# Patient Record
Sex: FEMALE | Race: WHITE | HISPANIC OR LATINO | Employment: OTHER | URBAN - METROPOLITAN AREA
[De-identification: names, ages, dates, MRNs, and addresses within clinical notes are randomized per-mention and may not be internally consistent; named-entity substitution may affect disease eponyms.]

---

## 2018-04-14 ENCOUNTER — OFFICE VISIT (OUTPATIENT)
Dept: URGENT CARE | Facility: CLINIC | Age: 48
End: 2018-04-14
Payer: MEDICAID

## 2018-04-14 VITALS
OXYGEN SATURATION: 98 % | HEART RATE: 75 BPM | TEMPERATURE: 98 F | DIASTOLIC BLOOD PRESSURE: 86 MMHG | SYSTOLIC BLOOD PRESSURE: 129 MMHG | WEIGHT: 210 LBS | BODY MASS INDEX: 38.41 KG/M2

## 2018-04-14 DIAGNOSIS — B37.31 YEAST INFECTION INVOLVING THE VAGINA AND SURROUNDING AREA: ICD-10-CM

## 2018-04-14 DIAGNOSIS — N30.00 ACUTE CYSTITIS WITHOUT HEMATURIA: ICD-10-CM

## 2018-04-14 DIAGNOSIS — R82.90 URINE ABNORMALITY: Primary | ICD-10-CM

## 2018-04-14 LAB
BILIRUB UR QL STRIP: NEGATIVE
GLUCOSE UR QL STRIP: NEGATIVE
KETONES UR QL STRIP: NEGATIVE
LEUKOCYTE ESTERASE UR QL STRIP: POSITIVE
PH, POC UA: 7 (ref 5–8)
POC BLOOD, URINE: NEGATIVE
POC NITRATES, URINE: NEGATIVE
PROT UR QL STRIP: NEGATIVE
SP GR UR STRIP: 1 (ref 1–1.03)
UROBILINOGEN UR STRIP-ACNC: NORMAL (ref 0.1–1.1)

## 2018-04-14 PROCEDURE — 81003 URINALYSIS AUTO W/O SCOPE: CPT | Mod: QW,S$GLB,, | Performed by: NURSE PRACTITIONER

## 2018-04-14 PROCEDURE — 99203 OFFICE O/P NEW LOW 30 MIN: CPT | Mod: 25,S$GLB,, | Performed by: NURSE PRACTITIONER

## 2018-04-14 RX ORDER — NITROFURANTOIN 25; 75 MG/1; MG/1
100 CAPSULE ORAL 2 TIMES DAILY
Qty: 10 CAPSULE | Refills: 0 | Status: SHIPPED | OUTPATIENT
Start: 2018-04-14 | End: 2018-04-19

## 2018-04-14 RX ORDER — FLUCONAZOLE 150 MG/1
150 TABLET ORAL DAILY
Qty: 1 TABLET | Refills: 0 | Status: SHIPPED | OUTPATIENT
Start: 2018-04-14 | End: 2018-04-15

## 2018-04-14 NOTE — PATIENT INSTRUCTIONS
Candida Vaginal Infection    You have a Candida vaginal infection. This is also known as a yeast infection. It is most often caused by a type of yeast (fungus) called Candida. Candida are normally found in the vagina. But if they increase in number, this can lead to infection and cause symptoms.  Symptoms of a yeast infection can include:  · Clumpy or thin, white discharge, which may look like cottage cheese  · Itching or burning  · Burning with urination  Certain factors can make a yeast infection more likely. These can include:  · Taking certain medicines, such as antibiotics or birth control pills  · Pregnancy  · Diabetes  · Weakened immune system  A yeast infection is most often treated with antifungal medicine. This may be given as a vaginal cream or pills you take by mouth. Treatment may last for about 1 to 7 days. Women with severe or recurrent infections may need longer courses of treatment.  Home care  · If youre prescribed medicine, be sure to use it as directed. Finish all of the medicine, even if your symptoms go away. Note: Dont try to treat yourself using over-the-counter products without talking to your provider first. He or she will let you know if this is a good option for you.  · Ask your provider what steps you can take to help reduce your risk of having a yeast infection in the future.  Follow-up care  Follow up with your healthcare provider, or as directed.  When to seek medical advice  Call your healthcare provider right away if:  · You have a fever of 100.4ºF (38ºC) or higher, or as directed by your provider.  · Your symptoms worsen, or they dont go away within a few days of starting treatment.  · You have new pain in the lower belly or pelvic region.  · You have side effects that bother you or a reaction to the cream or pills youre prescribed.  · You or any partners you have sex with have new symptoms, such as a rash, joint pain, or sores.  Date Last Reviewed: 7/30/2015  © 3598-3876 The  "[employer] [address] [place] [address]. All rights reserved. This information is not intended as a substitute for professional medical care. Always follow your healthcare professional's instructions.      Please return here or go to the Emergency Department for any concerns or worsening of condition or if this does not improve within the next 24-48 hours.  If you were prescribed antibiotics or antifungals, please take them to completion.  IPlease follow up with your primary care doctor or specialist as needed.    If you  smoke, please stop smoking.  Understanding Urinary Tract Infections (UTIs)  Most UTIs are caused by bacteria, although they may also be caused by viruses or fungi. Bacteria from the bowel are the most common source of infection. The infection may start because of any of the following:  · Sexual activity. During sex, bacteria can travel from the penis, vagina, or rectum into the urethra.   · Bacteria on the skin outside the rectum may travel into the urethra. This is more common in women since the rectum and urethra are closer to each other than in men. Wiping from front to back after using the toilet and keeping the area clean can help prevent germs from getting to the urethra.  · Blockage of urine flow through the urinary tract. If urine sits too long, germs may start to grow out of control.      Parts of the urinary tract  The infection can occur in any part of the urinary tract.  · The kidneys collect and store urine.  · The ureters carry urine from the kidneys to the bladder.  · The bladder holds urine until you are ready to let it out.  · The urethra carries urine from the bladder out of the body. It is shorter in women, so bacteria can move through it more easily. The urethra is longer in men, so a UTI is less likely to reach the bladder or kidneys in men.  Date Last Reviewed: 1/1/2017  © 8784-7778 The "[employer] [address] [place] [address].  All rights reserved. This information is not intended as a substitute for professional medical care. Always follow your healthcare professional's instructions.

## 2018-04-14 NOTE — PROGRESS NOTES
Subjective:       Patient ID: Augustina Bailey is a 47 y.o. female.    Vitals:  weight is 95.3 kg (210 lb). Her tympanic temperature is 98.4 °F (36.9 °C). Her blood pressure is 129/86 and her pulse is 75. Her oxygen saturation is 98%.     Chief Complaint: Urinary Tract Infection    Hx of recurring UTI's w yeast infection, started last pm, hx of kidney stones and recent hernia repair in 10/2017.        Urinary Tract Infection    This is a new problem. The current episode started acute onset. The problem occurs every urination. The problem has been gradually worsening. The quality of the pain is described as aching. The pain is at a severity of 0/10. The patient is experiencing no pain. There has been no fever. She is sexually active. There is a history of pyelonephritis. Associated symptoms include a discharge, flank pain, frequency, hesitancy, urgency and withholding. Pertinent negatives include no chills, hematuria, nausea or vomiting. She has tried nothing for the symptoms. The treatment provided no relief. Her past medical history is significant for hypertension, kidney stones and recurrent UTIs.     Review of Systems   Constitution: Negative for chills and fever.   Skin: Negative for itching.   Musculoskeletal: Negative for back pain.   Gastrointestinal: Negative for abdominal pain, nausea and vomiting.   Genitourinary: Positive for dysuria, flank pain, frequency, hesitancy and urgency. Negative for genital sores, hematuria, missed menses and non-menstrual bleeding.       Objective:      Physical Exam   Constitutional: She is oriented to person, place, and time. She appears well-developed and well-nourished.   HENT:   Head: Normocephalic and atraumatic.   Right Ear: External ear normal.   Left Ear: External ear normal.   Nose: Nose normal. No nasal deformity. No epistaxis.   Mouth/Throat: Oropharynx is clear and moist and mucous membranes are normal.   Eyes: Conjunctivae and lids are normal.   Neck: Trachea normal,  normal range of motion and phonation normal. Neck supple.   Cardiovascular: Normal rate, regular rhythm, normal heart sounds and normal pulses.    Pulmonary/Chest: Effort normal and breath sounds normal.   Abdominal: Soft. Normal appearance and bowel sounds are normal. She exhibits no distension and no mass. There is no tenderness. There is no CVA tenderness.   Genitourinary: Vaginal discharge found.   Neurological: She is alert and oriented to person, place, and time.   Skin: Skin is warm, dry and intact.   Psychiatric: She has a normal mood and affect. Her speech is normal and behavior is normal. Cognition and memory are normal.   Nursing note and vitals reviewed.      Assessment:       1. Urine abnormality    2. Yeast infection involving the vagina and surrounding area    3. Acute cystitis without hematuria        Plan:         Urine abnormality  -     POCT Urinalysis, Dipstick, Automated, W/O Scope    Yeast infection involving the vagina and surrounding area    Acute cystitis without hematuria    Other orders  -     fluconazole (DIFLUCAN) 150 MG Tab; Take 1 tablet (150 mg total) by mouth once daily.  Dispense: 1 tablet; Refill: 0  -     nitrofurantoin, macrocrystal-monohydrate, (MACROBID) 100 MG capsule; Take 1 capsule (100 mg total) by mouth 2 (two) times daily.  Dispense: 10 capsule; Refill: 0        Candida Vaginal Infection    You have a Candida vaginal infection. This is also known as a yeast infection. It is most often caused by a type of yeast (fungus) called Candida. Candida are normally found in the vagina. But if they increase in number, this can lead to infection and cause symptoms.  Symptoms of a yeast infection can include:  · Clumpy or thin, white discharge, which may look like cottage cheese  · Itching or burning  · Burning with urination  Certain factors can make a yeast infection more likely. These can include:  · Taking certain medicines, such as antibiotics or birth control  pills  · Pregnancy  · Diabetes  · Weakened immune system  A yeast infection is most often treated with antifungal medicine. This may be given as a vaginal cream or pills you take by mouth. Treatment may last for about 1 to 7 days. Women with severe or recurrent infections may need longer courses of treatment.  Home care  · If youre prescribed medicine, be sure to use it as directed. Finish all of the medicine, even if your symptoms go away. Note: Dont try to treat yourself using over-the-counter products without talking to your provider first. He or she will let you know if this is a good option for you.  · Ask your provider what steps you can take to help reduce your risk of having a yeast infection in the future.  Follow-up care  Follow up with your healthcare provider, or as directed.  When to seek medical advice  Call your healthcare provider right away if:  · You have a fever of 100.4ºF (38ºC) or higher, or as directed by your provider.  · Your symptoms worsen, or they dont go away within a few days of starting treatment.  · You have new pain in the lower belly or pelvic region.  · You have side effects that bother you or a reaction to the cream or pills youre prescribed.  · You or any partners you have sex with have new symptoms, such as a rash, joint pain, or sores.  Date Last Reviewed: 7/30/2015 © 2000-2017 [employer]. [address] [place] [address]. All rights reserved. This information is not intended as a substitute for professional medical care. Always follow your healthcare professional's instructions.      Please return here or go to the Emergency Department for any concerns or worsening of condition or if this does not improve within the next 24-48 hours.  If you were prescribed antibiotics or antifungals, please take them to completion.  IPlease follow up with your primary care doctor or specialist as needed.    If you  smoke, please stop smoking.  Understanding Urinary  Tract Infections (UTIs)  Most UTIs are caused by bacteria, although they may also be caused by viruses or fungi. Bacteria from the bowel are the most common source of infection. The infection may start because of any of the following:  · Sexual activity. During sex, bacteria can travel from the penis, vagina, or rectum into the urethra.   · Bacteria on the skin outside the rectum may travel into the urethra. This is more common in women since the rectum and urethra are closer to each other than in men. Wiping from front to back after using the toilet and keeping the area clean can help prevent germs from getting to the urethra.  · Blockage of urine flow through the urinary tract. If urine sits too long, germs may start to grow out of control.      Parts of the urinary tract  The infection can occur in any part of the urinary tract.  · The kidneys collect and store urine.  · The ureters carry urine from the kidneys to the bladder.  · The bladder holds urine until you are ready to let it out.  · The urethra carries urine from the bladder out of the body. It is shorter in women, so bacteria can move through it more easily. The urethra is longer in men, so a UTI is less likely to reach the bladder or kidneys in men.  Date Last Reviewed: 1/1/2017  © 2467-3061 [employer]. [address] [place] [address], [place] [address]. All rights reserved. This information is not intended as a substitute for professional medical care. Always follow your healthcare professional's instructions.

## 2018-09-10 ENCOUNTER — HOSPITAL ENCOUNTER (OUTPATIENT)
Dept: PREADMISSION TESTING | Facility: HOSPITAL | Age: 48
Discharge: HOME | End: 2018-09-10
Attending: SURGERY
Payer: MEDICAID

## 2018-09-10 VITALS
BODY MASS INDEX: 42.07 KG/M2 | SYSTOLIC BLOOD PRESSURE: 135 MMHG | HEIGHT: 62 IN | TEMPERATURE: 99 F | HEART RATE: 71 BPM | WEIGHT: 228.63 LBS | RESPIRATION RATE: 16 BRPM | DIASTOLIC BLOOD PRESSURE: 93 MMHG | OXYGEN SATURATION: 95 %

## 2018-09-10 DIAGNOSIS — C50.412 MALIGNANT NEOPLASM OF UPPER-OUTER QUADRANT OF LEFT FEMALE BREAST, UNSPECIFIED ESTROGEN RECEPTOR STATUS: ICD-10-CM

## 2018-09-10 DIAGNOSIS — Z01.818 PRE-OP TESTING: Primary | ICD-10-CM

## 2018-09-10 LAB
ALBUMIN SERPL BCP-MCNC: 3.8 G/DL
ALP SERPL-CCNC: 58 U/L
ALT SERPL W/O P-5'-P-CCNC: 57 U/L
ANION GAP SERPL CALC-SCNC: 8 MMOL/L
AST SERPL-CCNC: 36 U/L
BASOPHILS # BLD AUTO: 0.02 K/UL
BASOPHILS NFR BLD: 0.3 %
BILIRUB SERPL-MCNC: 0.7 MG/DL
BUN SERPL-MCNC: 8 MG/DL
CALCIUM SERPL-MCNC: 9.7 MG/DL
CHLORIDE SERPL-SCNC: 106 MMOL/L
CO2 SERPL-SCNC: 25 MMOL/L
CREAT SERPL-MCNC: 0.8 MG/DL
DIFFERENTIAL METHOD: NORMAL
EOSINOPHIL # BLD AUTO: 0.1 K/UL
EOSINOPHIL NFR BLD: 1.3 %
ERYTHROCYTE [DISTWIDTH] IN BLOOD BY AUTOMATED COUNT: 12.6 %
EST. GFR  (AFRICAN AMERICAN): >60 ML/MIN/1.73 M^2
EST. GFR  (NON AFRICAN AMERICAN): >60 ML/MIN/1.73 M^2
GLUCOSE SERPL-MCNC: 79 MG/DL
HCT VFR BLD AUTO: 42.7 %
HGB BLD-MCNC: 14.8 G/DL
LYMPHOCYTES # BLD AUTO: 2 K/UL
LYMPHOCYTES NFR BLD: 29.3 %
MCH RBC QN AUTO: 30.5 PG
MCHC RBC AUTO-ENTMCNC: 34.7 G/DL
MCV RBC AUTO: 88 FL
MONOCYTES # BLD AUTO: 0.5 K/UL
MONOCYTES NFR BLD: 7.8 %
NEUTROPHILS # BLD AUTO: 4.2 K/UL
NEUTROPHILS NFR BLD: 61.3 %
PLATELET # BLD AUTO: 227 K/UL
PMV BLD AUTO: 10.9 FL
POTASSIUM SERPL-SCNC: 4.4 MMOL/L
PROT SERPL-MCNC: 7.7 G/DL
RBC # BLD AUTO: 4.85 M/UL
SODIUM SERPL-SCNC: 139 MMOL/L
WBC # BLD AUTO: 6.92 K/UL

## 2018-09-10 PROCEDURE — 85025 COMPLETE CBC W/AUTO DIFF WBC: CPT

## 2018-09-10 PROCEDURE — 80053 COMPREHEN METABOLIC PANEL: CPT

## 2018-09-10 PROCEDURE — 93010 ELECTROCARDIOGRAM REPORT: CPT | Mod: ,,, | Performed by: INTERNAL MEDICINE

## 2018-09-10 PROCEDURE — 93005 ELECTROCARDIOGRAM TRACING: CPT

## 2018-09-10 PROCEDURE — 36415 COLL VENOUS BLD VENIPUNCTURE: CPT

## 2018-09-10 RX ORDER — AMOXICILLIN 500 MG/1
500 CAPSULE ORAL EVERY 8 HOURS
Status: ON HOLD | COMMUNITY
End: 2018-09-17 | Stop reason: CLARIF

## 2018-09-10 NOTE — DISCHARGE INSTRUCTIONS
Your surgery is scheduled for _Monday Sept 17, 2018____.    Call [phone] between 2 p.m. and 5 p.m. on   _Friday___ to find out your arrival time for the day of your surgery.      Please report to SAME DAY SURGERY UNIT on the 2nd FLOOR at _______ a.m.  Use front door entrance. The doors open at 0530 am.          INSTRUCTIONS IMPORTANT!!!  ¨ Do not eat or drink after 12 midnight-including water. OK to brush teeth, no   gum, candy or mints!            _x___  Prep instructions:   SHOWER     _x___  Please shower using Hibiclens soap the night before AND  the morning of  your surgery/procedure. Do not use Hibiclens on your face or genitals       x        If your surgery is around your belly button (Navel) be sure to wash inside your belly button also. Rinse hibiclens off completely.  _x___  No shaving of procedural area at least 4-5 days before surgery due to  increased risk of skin irritation and/or possible infection.  _x___  Do not wear makeup, including mascara. WEARING EYE MAKEUP MAY LEAD TO SERIOUS EYE INJURY during surgery.  _x___  No powder, lotions or creams to your body.  _x___  You may wear only deodorant on the day of surgery.  _x___  Please remove all jewelry, including piercings and leave at home.  _x___  No money or valuables needed. Please leave at home.  You may bring your  cell phone.  ____  Please bring any documents given by your doctor.  _x___  If going home the same day, arrange for a ride home. You will not be able to   drive if Anesthesia was used.  ____  Children under 18 years require a parent / guardian present the entire time   they are in surgery / recovery.  __x__  Wear loose fitting clothing. Allow for dressings, bandages.  _x___  Stop Aspirin, Ibuprofen, Motrin and Aleve at least 3-5 days before  surgery, unless otherwise instructed by your doctor, or the nurse.              You MAY use Tylenol/acetaminophen until day of surgery.  ____  If you take diabetic medication, do not take am of  surgery unless instructed by   Doctor.  _x___  Call MD for temperature above 101 degrees.        _x___ Stop taking any Fish Oil supplement or any Vitamins that contain Vitamin  E at least 5 days prior to surgery.              I have read or had read and explained to me, and understand the above information.  Additional comments or instructions:Please call   [phone] if you have any questions regarding the instructions above.

## 2018-09-17 ENCOUNTER — HOSPITAL ENCOUNTER (OUTPATIENT)
Facility: HOSPITAL | Age: 48
Discharge: HOME | End: 2018-09-17
Attending: SURGERY | Admitting: SURGERY
Payer: MEDICAID

## 2018-09-17 ENCOUNTER — ANESTHESIA EVENT (OUTPATIENT)
Dept: SURGERY | Facility: HOSPITAL | Age: 48
End: 2018-09-17
Payer: MEDICAID

## 2018-09-17 ENCOUNTER — ANESTHESIA (OUTPATIENT)
Dept: SURGERY | Facility: HOSPITAL | Age: 48
End: 2018-09-17
Payer: MEDICAID

## 2018-09-17 VITALS
DIASTOLIC BLOOD PRESSURE: 76 MMHG | HEART RATE: 64 BPM | WEIGHT: 228.56 LBS | OXYGEN SATURATION: 96 % | TEMPERATURE: 98 F | RESPIRATION RATE: 16 BRPM | HEIGHT: 62 IN | SYSTOLIC BLOOD PRESSURE: 130 MMHG | BODY MASS INDEX: 42.06 KG/M2

## 2018-09-17 DIAGNOSIS — C50.412 BREAST CANCER OF UPPER-OUTER QUADRANT OF LEFT FEMALE BREAST: ICD-10-CM

## 2018-09-17 DIAGNOSIS — C50.412 MALIGNANT NEOPLASM OF UPPER-OUTER QUADRANT OF LEFT FEMALE BREAST, UNSPECIFIED ESTROGEN RECEPTOR STATUS: Primary | ICD-10-CM

## 2018-09-17 LAB — POCT GLUCOSE: 109 MG/DL (ref 70–110)

## 2018-09-17 PROCEDURE — 71000033 HC RECOVERY, INTIAL HOUR: Performed by: SURGERY

## 2018-09-17 PROCEDURE — 25000003 PHARM REV CODE 250: Performed by: SURGERY

## 2018-09-17 PROCEDURE — 00532 ANES ACCESS CTR VENOUS CRCJ: CPT | Performed by: SURGERY

## 2018-09-17 PROCEDURE — 25000003 PHARM REV CODE 250: Performed by: NURSE ANESTHETIST, CERTIFIED REGISTERED

## 2018-09-17 PROCEDURE — D9220A PRA ANESTHESIA: Mod: CRNA,,, | Performed by: NURSE ANESTHETIST, CERTIFIED REGISTERED

## 2018-09-17 PROCEDURE — 63600175 PHARM REV CODE 636 W HCPCS: Performed by: SURGERY

## 2018-09-17 PROCEDURE — 36000706: Performed by: SURGERY

## 2018-09-17 PROCEDURE — 63600175 PHARM REV CODE 636 W HCPCS: Performed by: ANESTHESIOLOGY

## 2018-09-17 PROCEDURE — 37000008 HC ANESTHESIA 1ST 15 MINUTES: Performed by: SURGERY

## 2018-09-17 PROCEDURE — 71000016 HC POSTOP RECOV ADDL HR: Performed by: SURGERY

## 2018-09-17 PROCEDURE — 71000039 HC RECOVERY, EACH ADD'L HOUR: Performed by: SURGERY

## 2018-09-17 PROCEDURE — 71000015 HC POSTOP RECOV 1ST HR: Performed by: SURGERY

## 2018-09-17 PROCEDURE — 36000707: Performed by: SURGERY

## 2018-09-17 PROCEDURE — 37000009 HC ANESTHESIA EA ADD 15 MINS: Performed by: SURGERY

## 2018-09-17 PROCEDURE — 63600175 PHARM REV CODE 636 W HCPCS: Performed by: NURSE ANESTHETIST, CERTIFIED REGISTERED

## 2018-09-17 PROCEDURE — C1788 PORT, INDWELLING, IMP: HCPCS | Performed by: SURGERY

## 2018-09-17 PROCEDURE — D9220A PRA ANESTHESIA: Mod: ANES,,, | Performed by: ANESTHESIOLOGY

## 2018-09-17 PROCEDURE — 25000003 PHARM REV CODE 250: Performed by: ANESTHESIOLOGY

## 2018-09-17 PROCEDURE — C1769 GUIDE WIRE: HCPCS | Performed by: SURGERY

## 2018-09-17 DEVICE — PORT POWER 8FR NO SUT PLUG: Type: IMPLANTABLE DEVICE | Site: CHEST | Status: FUNCTIONAL

## 2018-09-17 RX ORDER — FENTANYL CITRATE 50 UG/ML
INJECTION, SOLUTION INTRAMUSCULAR; INTRAVENOUS
Status: DISCONTINUED | OUTPATIENT
Start: 2018-09-17 | End: 2018-09-17

## 2018-09-17 RX ORDER — GLYCOPYRROLATE 0.2 MG/ML
INJECTION INTRAMUSCULAR; INTRAVENOUS
Status: DISCONTINUED | OUTPATIENT
Start: 2018-09-17 | End: 2018-09-17

## 2018-09-17 RX ORDER — ROCURONIUM BROMIDE 10 MG/ML
INJECTION, SOLUTION INTRAVENOUS
Status: DISCONTINUED | OUTPATIENT
Start: 2018-09-17 | End: 2018-09-17

## 2018-09-17 RX ORDER — SODIUM CHLORIDE 9 MG/ML
INJECTION, SOLUTION INTRAVENOUS CONTINUOUS
Status: DISCONTINUED | OUTPATIENT
Start: 2018-09-17 | End: 2018-09-17 | Stop reason: HOSPADM

## 2018-09-17 RX ORDER — MORPHINE SULFATE 10 MG/ML
3 INJECTION INTRAMUSCULAR; INTRAVENOUS; SUBCUTANEOUS
Status: DISCONTINUED | OUTPATIENT
Start: 2018-09-17 | End: 2018-09-17 | Stop reason: HOSPADM

## 2018-09-17 RX ORDER — METOCLOPRAMIDE HYDROCHLORIDE 5 MG/ML
10 INJECTION INTRAMUSCULAR; INTRAVENOUS EVERY 10 MIN PRN
Status: DISCONTINUED | OUTPATIENT
Start: 2018-09-17 | End: 2018-09-17 | Stop reason: HOSPADM

## 2018-09-17 RX ORDER — MEPERIDINE HYDROCHLORIDE 50 MG/ML
12.5 INJECTION INTRAMUSCULAR; INTRAVENOUS; SUBCUTANEOUS ONCE AS NEEDED
Status: DISCONTINUED | OUTPATIENT
Start: 2018-09-17 | End: 2018-09-17 | Stop reason: HOSPADM

## 2018-09-17 RX ORDER — SODIUM CHLORIDE 0.9 % (FLUSH) 0.9 %
3 SYRINGE (ML) INJECTION
Status: DISCONTINUED | OUTPATIENT
Start: 2018-09-17 | End: 2018-09-17 | Stop reason: HOSPADM

## 2018-09-17 RX ORDER — PROPOFOL 10 MG/ML
VIAL (ML) INTRAVENOUS
Status: DISCONTINUED | OUTPATIENT
Start: 2018-09-17 | End: 2018-09-17

## 2018-09-17 RX ORDER — MIDAZOLAM HYDROCHLORIDE 1 MG/ML
INJECTION, SOLUTION INTRAMUSCULAR; INTRAVENOUS
Status: DISCONTINUED | OUTPATIENT
Start: 2018-09-17 | End: 2018-09-17

## 2018-09-17 RX ORDER — ACETAMINOPHEN 10 MG/ML
1000 INJECTION, SOLUTION INTRAVENOUS ONCE
Status: DISCONTINUED | OUTPATIENT
Start: 2018-09-17 | End: 2018-09-17

## 2018-09-17 RX ORDER — HEPARIN SODIUM 1000 [USP'U]/ML
INJECTION, SOLUTION INTRAVENOUS; SUBCUTANEOUS
Status: DISCONTINUED | OUTPATIENT
Start: 2018-09-17 | End: 2018-09-17 | Stop reason: HOSPADM

## 2018-09-17 RX ORDER — ACETAMINOPHEN 10 MG/ML
INJECTION, SOLUTION INTRAVENOUS
Status: DISCONTINUED | OUTPATIENT
Start: 2018-09-17 | End: 2018-09-17

## 2018-09-17 RX ORDER — OXYCODONE AND ACETAMINOPHEN 5; 325 MG/1; MG/1
1 TABLET ORAL
Status: DISCONTINUED | OUTPATIENT
Start: 2018-09-17 | End: 2018-09-17 | Stop reason: HOSPADM

## 2018-09-17 RX ORDER — OXYCODONE AND ACETAMINOPHEN 5; 325 MG/1; MG/1
1 TABLET ORAL EVERY 4 HOURS PRN
Qty: 30 TABLET | Refills: 0 | Status: SHIPPED | OUTPATIENT
Start: 2018-09-17 | End: 2018-12-31

## 2018-09-17 RX ORDER — CEFAZOLIN SODIUM 2 G/50ML
2 SOLUTION INTRAVENOUS
Status: COMPLETED | OUTPATIENT
Start: 2018-09-17 | End: 2018-09-17

## 2018-09-17 RX ORDER — PHENYLEPHRINE HYDROCHLORIDE 10 MG/ML
INJECTION INTRAVENOUS
Status: DISCONTINUED | OUTPATIENT
Start: 2018-09-17 | End: 2018-09-17

## 2018-09-17 RX ORDER — HYDROMORPHONE HYDROCHLORIDE 2 MG/ML
0.2 INJECTION, SOLUTION INTRAMUSCULAR; INTRAVENOUS; SUBCUTANEOUS EVERY 5 MIN PRN
Status: DISCONTINUED | OUTPATIENT
Start: 2018-09-17 | End: 2018-09-17 | Stop reason: HOSPADM

## 2018-09-17 RX ORDER — LIDOCAINE HCL/PF 100 MG/5ML
SYRINGE (ML) INTRAVENOUS
Status: DISCONTINUED | OUTPATIENT
Start: 2018-09-17 | End: 2018-09-17

## 2018-09-17 RX ORDER — LIDOCAINE HYDROCHLORIDE 10 MG/ML
1 INJECTION, SOLUTION EPIDURAL; INFILTRATION; INTRACAUDAL; PERINEURAL ONCE
Status: DISCONTINUED | OUTPATIENT
Start: 2018-09-17 | End: 2018-09-17 | Stop reason: HOSPADM

## 2018-09-17 RX ORDER — ONDANSETRON 2 MG/ML
INJECTION INTRAMUSCULAR; INTRAVENOUS
Status: DISCONTINUED | OUTPATIENT
Start: 2018-09-17 | End: 2018-09-17

## 2018-09-17 RX ORDER — LIDOCAINE HYDROCHLORIDE 10 MG/ML
INJECTION, SOLUTION EPIDURAL; INFILTRATION; INTRACAUDAL; PERINEURAL
Status: DISCONTINUED | OUTPATIENT
Start: 2018-09-17 | End: 2018-09-17 | Stop reason: HOSPADM

## 2018-09-17 RX ORDER — BUPIVACAINE HYDROCHLORIDE 2.5 MG/ML
INJECTION, SOLUTION EPIDURAL; INFILTRATION; INTRACAUDAL
Status: DISCONTINUED | OUTPATIENT
Start: 2018-09-17 | End: 2018-09-17 | Stop reason: HOSPADM

## 2018-09-17 RX ORDER — SUCCINYLCHOLINE CHLORIDE 20 MG/ML
INJECTION INTRAMUSCULAR; INTRAVENOUS
Status: DISCONTINUED | OUTPATIENT
Start: 2018-09-17 | End: 2018-09-17

## 2018-09-17 RX ORDER — MORPHINE SULFATE 10 MG/ML
2 INJECTION INTRAMUSCULAR; INTRAVENOUS; SUBCUTANEOUS EVERY 5 MIN PRN
Status: DISCONTINUED | OUTPATIENT
Start: 2018-09-17 | End: 2018-09-17 | Stop reason: HOSPADM

## 2018-09-17 RX ORDER — NEOSTIGMINE METHYLSULFATE 1 MG/ML
INJECTION, SOLUTION INTRAVENOUS
Status: DISCONTINUED | OUTPATIENT
Start: 2018-09-17 | End: 2018-09-17

## 2018-09-17 RX ADMIN — ONDANSETRON 4 MG: 2 INJECTION, SOLUTION INTRAMUSCULAR; INTRAVENOUS at 09:09

## 2018-09-17 RX ADMIN — CEFAZOLIN SODIUM 2 G: 2 SOLUTION INTRAVENOUS at 09:09

## 2018-09-17 RX ADMIN — FENTANYL CITRATE 50 MCG: 50 INJECTION INTRAMUSCULAR; INTRAVENOUS at 09:09

## 2018-09-17 RX ADMIN — FENTANYL CITRATE 50 MCG: 50 INJECTION INTRAMUSCULAR; INTRAVENOUS at 10:09

## 2018-09-17 RX ADMIN — MIDAZOLAM HYDROCHLORIDE 2 MG: 1 INJECTION, SOLUTION INTRAMUSCULAR; INTRAVENOUS at 09:09

## 2018-09-17 RX ADMIN — SODIUM CHLORIDE: 0.9 INJECTION, SOLUTION INTRAVENOUS at 01:09

## 2018-09-17 RX ADMIN — PROPOFOL 40 MG: 10 INJECTION, EMULSION INTRAVENOUS at 09:09

## 2018-09-17 RX ADMIN — GLYCOPYRROLATE 0.6 MG: 0.2 INJECTION, SOLUTION INTRAMUSCULAR; INTRAVENOUS at 10:09

## 2018-09-17 RX ADMIN — ROCURONIUM BROMIDE 25 MG: 10 INJECTION, SOLUTION INTRAVENOUS at 10:09

## 2018-09-17 RX ADMIN — PROPOFOL 50 MG: 10 INJECTION, EMULSION INTRAVENOUS at 09:09

## 2018-09-17 RX ADMIN — GLYCOPYRROLATE 0.2 MG: 0.2 INJECTION, SOLUTION INTRAMUSCULAR; INTRAVENOUS at 09:09

## 2018-09-17 RX ADMIN — SODIUM CHLORIDE: 0.9 INJECTION, SOLUTION INTRAVENOUS at 07:09

## 2018-09-17 RX ADMIN — HYDROMORPHONE HYDROCHLORIDE 0.2 MG: 2 INJECTION INTRAMUSCULAR; INTRAVENOUS; SUBCUTANEOUS at 11:09

## 2018-09-17 RX ADMIN — LIDOCAINE HYDROCHLORIDE 100 MG: 20 INJECTION, SOLUTION INTRAVENOUS at 09:09

## 2018-09-17 RX ADMIN — ACETAMINOPHEN 1000 MG: 10 INJECTION, SOLUTION INTRAVENOUS at 09:09

## 2018-09-17 RX ADMIN — PHENYLEPHRINE HYDROCHLORIDE 100 MCG: 10 INJECTION INTRAVENOUS at 10:09

## 2018-09-17 RX ADMIN — PROPOFOL 150 MG: 10 INJECTION, EMULSION INTRAVENOUS at 09:09

## 2018-09-17 RX ADMIN — OXYCODONE HYDROCHLORIDE AND ACETAMINOPHEN 1 TABLET: 5; 325 TABLET ORAL at 01:09

## 2018-09-17 RX ADMIN — NEOSTIGMINE METHYLSULFATE 4 MG: 1 INJECTION INTRAVENOUS at 10:09

## 2018-09-17 RX ADMIN — SUCCINYLCHOLINE CHLORIDE 120 MG: 20 INJECTION, SOLUTION INTRAMUSCULAR; INTRAVENOUS at 09:09

## 2018-09-17 RX ADMIN — KETAMINE HYDROCHLORIDE 100 MG: 50 INJECTION, SOLUTION, CONCENTRATE INTRAMUSCULAR; INTRAVENOUS at 09:09

## 2018-09-17 NOTE — H&P
Chief Complaint: Left Breast Cancer     HPI 49 yo female with newly diagnosed left breast cancer with no family history here for right sided portacath placement for chemotherapy.     Review of Systems   Constitutional: Negative.    HENT: Negative.    Eyes: Negative.    Respiratory: Negative.    Cardiovascular: Negative.    Gastrointestinal: Negative.    Endocrine: Negative.    Musculoskeletal: Negative.    Skin: Negative.    Allergic/Immunologic: Negative.    Neurological: Negative.    Hematological: Negative.    Psychiatric/Behavioral: Negative.    All other systems reviewed and are negative.      Objective:   Physical Exam   Constitutional: She is oriented to person, place, and time. She appears well-developed and well-nourished.   HENT:   Head: Normocephalic and atraumatic.   Right Ear: External ear normal.   Left Ear: External ear normal.   Nose: Nose normal.   Mouth/Throat: Oropharynx is clear and moist.   Eyes: Conjunctivae and EOM are normal. Pupils are equal, round, and reactive to light.   Neck: Normal range of motion. Neck supple.   Cardiovascular: Normal rate, regular rhythm, normal heart sounds and intact distal pulses.    Pulmonary/Chest: Effort normal and breath sounds normal. Right breast exhibits no inverted nipple, no mass, no nipple discharge and no skin change. Left breast exhibits mass. Left breast exhibits no inverted nipple, no nipple discharge, no skin change and no tenderness.       Abdominal: Soft. Bowel sounds are normal.   Musculoskeletal: Normal range of motion.   Neurological: She is alert and oriented to person, place, and time. She has normal reflexes.   Skin: Skin is warm and dry.   Psychiatric: She has a normal mood and affect. Her behavior is normal. Thought content normal.   Vitals reviewed.      Assessment:       1. Malignant neoplasm of upper-outer quadrant of left breast in female, estrogen receptor positive      Multiple areas in the upper outer quadrant    Pt here for  placement of right sided portacath for initiation of chemotherapy.

## 2018-09-17 NOTE — OP NOTE
Port-A-Cath Insertion        DATE OF PROCEDURE: 09/17/2018     PREOPERATIVE DIAGNOSIS: Breast cancer     POSTOPERATIVE DIAGNOSIS: same     PROCEDURE PERFORMED: Port-A-Cath insertion via cephalic vein cut down, left.     SURGEON: Andre Fisher M.D.    ASST: Asif Walker MD     ANESTHESIA: General.     DESCRIPTION OF OPERATION: The patient was brought to the Operating Room, placed  on operating room table in supine position. Under adequate general anesthesia,  prepped and draped around her left shoulder in the usual sterile fashion.   Incision was made in deltopectoral groove, deepened to expose her cephalic vein.  Proximal and distal control was obtained. Small venotomy was performed. The catheter could not be advanced and the subclavian vein was accessed percutaneously using the Seldinger technique.  Catheter was inserted to the cavoatrial junction and verified with fluoroscopy.   This was attached to a port, which was fixed to the patient's chest wall and   flushed. The wound was then closed in layers with absorbable suture.   Steri-Strips were applied as well as bandage.     ESTIMATED BLOOD LOSS: Minimal.

## 2018-09-17 NOTE — PROGRESS NOTES
1135 Patient noted with decrease HR and B/P, and ectopy, Dr. Foster made aware. Reassessed patient no new orders at this time. Continue to monitor. On room air ,freq desats, place on 2l/nc. No other changes noted.     1230  Wean off O2 . sats maintained at 92-93%

## 2018-09-17 NOTE — ANESTHESIA PREPROCEDURE EVALUATION
09/17/2018  Augustina Bailey is a 48 y.o., female.    Anesthesia Evaluation    I have reviewed the Patient Summary Reports.     I have reviewed the Medications.     Review of Systems  Anesthesia Hx:  No problems with previous Anesthesia    Social:  Non-Smoker, No Alcohol Use    Hematology/Oncology:        Current/Recent Cancer. -- Cancer in past history:  Breast left   Cardiovascular:   Exercise tolerance: good Hypertension        Physical Exam  General:  Obesity    Airway/Jaw/Neck:  AIRWAY FINDINGS: Normal       Dental:  DENTAL FINDINGS: Normal   Chest/Lungs:  Chest/Lungs Clear    Heart/Vascular:  Heart Findings: Normal       Mental Status:  Mental Status Findings:  Cooperative, Alert and Oriented         Anesthesia Plan  Type of Anesthesia, risks & benefits discussed:  Anesthesia Type:  general  Patient's Preference:   Intra-op Monitoring Plan: standard ASA monitors  Intra-op Monitoring Plan Comments:   Post Op Pain Control Plan: multimodal analgesia, IV/PO Opioids PRN and per primary service following discharge from PACU  Post Op Pain Control Plan Comments:   Induction:   IV  Beta Blocker:  Patient is not currently on a Beta-Blocker (No further documentation required).       Informed Consent: Patient understands risks and agrees with Anesthesia plan.  Questions answered. Anesthesia consent signed with patient.  ASA Score: 2     Day of Surgery Review of History & Physical:    H&P update referred to the surgeon.         Ready For Surgery From Anesthesia Perspective.

## 2018-09-17 NOTE — BRIEF OP NOTE
[facility]  Brief Operative Note     SUMMARY     Surgery Date: 9/17/2018     Surgeon(s) and Role:     * Andre Fisher MD - Primary    Assisting Surgeon: None    Pre-op Diagnosis:  Carcinoma of upper-outer quadrant of female breast, left [C50.412]    Post-op Diagnosis:  Post-Op Diagnosis Codes:     * Carcinoma of upper-outer quadrant of female breast, left [C50.412]    Procedure(s) (LRB):  INSERTION, PORT-A-CATH (N/A)    Anesthesia: Local MAC    Description of the findings of the procedure: small cephalic vein    Findings/Key Components: same    Estimated Blood Loss: minimal         Specimens:   Specimen (12h ago, onward)    None          Discharge Note    SUMMARY     Admit Date: 9/17/2018    Discharge Date and Time:  09/17/2018 10:21 AM    Hospital Course (synopsis of major diagnoses, care, treatment, and services provided during the course of the hospital stay): uneventful post op course     Final Diagnosis: Post-Op Diagnosis Codes:     * Carcinoma of upper-outer quadrant of female breast, left [C50.412]    Disposition: Home or Self Care    Follow Up/Patient Instructions:     Medications:  Reconciled Home Medications:      Medication List      START taking these medications    oxyCODONE-acetaminophen 5-325 mg per tablet  Commonly known as:  PERCOCET  Take 1 tablet by mouth every 4 (four) hours as needed for Pain.        CONTINUE taking these medications    losartan 50 MG tablet  Commonly known as:  COZAAR  Take 50 mg by mouth once daily.          Discharge Procedure Orders   Ambulatory Referral to External Surgery   Referral Priority: Routine Referral Type: Surgical   Requested Specialty: Surgery   Number of Visits Requested: 1     Diet general     Call MD for:  temperature >100.4     Call MD for:  persistent nausea and vomiting     Call MD for:  severe uncontrolled pain     Call MD for:  difficulty breathing, headache or visual disturbances     Call MD for:  redness, tenderness, or signs of  infection (pain, swelling, redness, odor or green/yellow discharge around incision site)     Call MD for:  hives     Remove dressing in 24 hours     Shower on day dressing removed (No bath)     Follow-up Information     Andre Fisher MD In 1 week.    Specialties:  General Surgery, Oncology  Contact information:  [address] [place] [address]  [id number]  [place] [id number] [phone]

## 2018-09-17 NOTE — TRANSFER OF CARE
"Anesthesia Transfer of Care Note    Patient: Augustina Bailey    Procedure(s) Performed: Procedure(s) (LRB):  INSERTION, PORT-A-CATH (N/A)    Patient location: PACU    Anesthesia Type: general    Transport from OR: Transported from OR on room air with adequate spontaneous ventilation    Post pain: adequate analgesia    Post assessment: no apparent anesthetic complications and tolerated procedure well    Post vital signs: stable    Level of consciousness: awake and alert    Nausea/Vomiting: no nausea/vomiting    Complications: none    Transfer of care protocol was followed      Last vitals:   Visit Vitals  BP (!) 141/79 (BP Location: Right arm, Patient Position: Lying)   Pulse 81   Temp 36.6 °C (97.9 °F) (Oral)   Resp 16   Ht 5' 2" (1.575 m)   Wt 103.7 kg (228 lb 9 oz)   SpO2 95%   Breastfeeding? No   BMI 41.80 kg/m²     "

## 2018-09-17 NOTE — DISCHARGE INSTRUCTIONS
Janis Jacob and Natalia   Office # [phone]     Discharge Instructions for Same Day Surgery     Call the office for and appointment if one has not already been made.     Diet: Drink plenty of fluids the first 48 hours and you may resume your   usual diet.     Activity: No heavy lifting (over 10 pounds), pushing or pulling until your   post op visit. Your doctor's office may have told you to limit your lifting to less weight, or even no weight.  Be sure to follow those instructions.    Note: You may ride in a car and you may drive when comfortable.     Do not drive, drink alcohol, or sign legal documents for 24 hours, or if taking narcotic pain medication.    Dressings:   You may take the outer dressing off tomorrow   You have Dermabond or a material like it was used on your incision.   It is like a liquid glue.   Do not peel or try to remove it it will start to fall off in 7-10 days on its' own.   It is OK to shower, pat dry, do not apply any creams or lotions.    No tub baths, swimming pools, hot tubs or submersion of the incision until your surgeon says it's ok.     Pain medication: last dose 1:30 pm  Ice pack use: 20 minutes on each hour while awake    Medical: Call the doctor for any of the following problems: fever above 101,   severe pain, bleeding, or abdominal distention (swelling).   If constipated you may take any stool softener you choose.     Occasionally small areas of skin numbness or an unpleasant skin sensation can result. Also, you may find that your incision is swollen and tender for a few days.  Some redness around sutures and staples is a normal reaction, but if the discomfort persists or worsens, call you doctor.     Fall Prevention  Millions of people fall every year and injure themselves. You may have had anesthesia or sedation which may increase your risk of falling. You may have health issues that put you at an increased risk of falling.     Here are ways to reduce your risk of  falling.  ·   · Make your home safe by keeping walkways clear of objects you may trip over.  · Use non-slip pads under rugs. Do not use area rugs or small throw rugs.  · Use non-slip mats in bathtubs and showers.  · Install handrails and lights on staircases.  · Do not walk in poorly lit areas.  · Do not stand on chairs or wobbly ladders.  · Use caution when reaching overhead or looking upward. This position can cause a loss of balance.  · Be sure your shoes fit properly, have non-slip bottoms and are in good condition.   · Wear shoes both inside and out. Avoid going barefoot or wearing slippers.  · Be cautious when going up and down stairs, curbs, and when walking on uneven sidewalks.  · If your balance is poor, consider using a cane or walker.  · If your fall was related to alcohol use, stop or limit alcohol intake.   · If your fall was related to use of sleeping medicines, talk to your doctor about this. You may need to reduce your dosage at bedtime if you awaken during the night to go to the bathroom.    · To reduce the need for nighttime bathroom trips:  ¨ Avoid drinking fluids for several hours before going to bed  ¨ Empty your bladder before going to bed  ¨ Men can keep a urinal at the bedside  · Stay as active as you can. Balance, flexibility, strength, and endurance all come from exercise. They all play a role in preventing falls. Ask your healthcare provider which types of activity are right for you.  · Get your vision checked on a regular basis.  · If you have pets, know where they are before you stand up or walk so you don't trip over them.  · Use night lights.      Comfort Measures  1.  For the first few days, it is common for the area around the incision to be swollen, discolored (black & blue), and sore.  To help reduce swelling, apply an ice pack or a  bag of frozen peas may be applied to the swollen area for 15 to 20 minutes every hour for 3 days or more as needed.  Wear loose, comfortable  clothing.    2  If your physician agrees you may  use  over-the-counter anti-inflammatory medication such as ibuprofen (Advil, Motrin, Aleve) to minimize swelling, inflammation, and pain.  Take medication every 4-6 hours with food.    Activity  .  Lifting with the arm on the side of the mediport should be restricted to 10-15 pounds or less for 2 weeks.  Avoid pushing, pulling, straining, or any strenuous activity.  You may climb stairs.  You may drive if you are comfortable enough behind the wheel and can safely operate the vehicle.

## 2018-09-18 NOTE — ANESTHESIA POSTPROCEDURE EVALUATION
"Anesthesia Post Evaluation    Patient: Augustina Bailey    Procedure(s) Performed: Procedure(s) (LRB):  INSERTION, PORT-A-CATH (N/A)    Final Anesthesia Type: general  Patient location during evaluation: PACU  Patient participation: Yes- Able to Participate  Level of consciousness: awake and alert and oriented  Post-procedure vital signs: reviewed and stable  Pain management: adequate  Airway patency: patent  PONV status at discharge: No PONV  Anesthetic complications: no      Cardiovascular status: blood pressure returned to baseline and hemodynamically stable  Respiratory status: unassisted, spontaneous ventilation and room air  Hydration status: euvolemic  Follow-up not needed.        Visit Vitals  /76   Pulse 64   Temp 36.8 °C (98.2 °F) (Oral)   Resp 16   Ht 5' 2" (1.575 m)   Wt 103.7 kg (228 lb 9 oz)   SpO2 96%   Breastfeeding? No   BMI 41.80 kg/m²       Pain/Rajinder Score: Pain Assessment Performed: Yes (9/17/2018  1:20 PM)  Presence of Pain: complains of pain/discomfort (9/17/2018  1:00 PM)  Pain Rating Prior to Med Admin: 10 (9/17/2018  1:30 PM)  Pain Rating Post Med Admin: 6 (9/17/2018  4:30 PM)  Rajinder Score: 10 (9/17/2018  1:00 PM)  Modified Rajinder Score: 19 (9/17/2018  4:30 PM)        "

## 2018-12-12 ENCOUNTER — TELEPHONE (OUTPATIENT)
Dept: SURGERY | Facility: CLINIC | Age: 48
End: 2018-12-12

## 2018-12-12 NOTE — TELEPHONE ENCOUNTER
----- Message from Deacon Cuellar sent at 12/12/2018  3:29 PM CST -----  Contact: Self/[phone]  The patient would like to speak to the staff in regards to scheduling her surgery.        Thank you

## 2018-12-31 ENCOUNTER — OFFICE VISIT (OUTPATIENT)
Dept: SURGERY | Facility: CLINIC | Age: 48
End: 2018-12-31
Payer: MEDICAID

## 2018-12-31 VITALS
SYSTOLIC BLOOD PRESSURE: 125 MMHG | WEIGHT: 240 LBS | BODY MASS INDEX: 44.16 KG/M2 | DIASTOLIC BLOOD PRESSURE: 87 MMHG | HEIGHT: 62 IN | HEART RATE: 74 BPM

## 2018-12-31 DIAGNOSIS — C50.412 MALIGNANT NEOPLASM OF UPPER-OUTER QUADRANT OF LEFT FEMALE BREAST, UNSPECIFIED ESTROGEN RECEPTOR STATUS: Primary | ICD-10-CM

## 2018-12-31 PROCEDURE — 99214 OFFICE O/P EST MOD 30 MIN: CPT | Mod: S$GLB,,, | Performed by: SURGERY

## 2018-12-31 NOTE — PROGRESS NOTES
Subjective:       Patient ID: Augustina Bailey is a 48 y.o. female.    Chief Complaint: Recheck    HPI 49 yo female with breast cancer s/p neoadjuvant chemotherapy and now for scheduling of her mastectomy.  Review of Systems   Constitutional: Negative.    HENT: Negative.    Eyes: Negative.    Respiratory: Negative.    Cardiovascular: Negative.    Gastrointestinal: Negative.    Endocrine: Negative.    Musculoskeletal: Negative.    Skin: Negative.    Allergic/Immunologic: Negative.    Neurological: Negative.    Hematological: Negative.    Psychiatric/Behavioral: Negative.    All other systems reviewed and are negative.      Objective:      Physical Exam   Constitutional: She is oriented to person, place, and time. She appears well-developed and well-nourished.   HENT:   Head: Normocephalic and atraumatic.   Right Ear: External ear normal.   Left Ear: External ear normal.   Nose: Nose normal.   Mouth/Throat: Oropharynx is clear and moist.   Eyes: Conjunctivae and EOM are normal. Pupils are equal, round, and reactive to light.   Neck: Normal range of motion. Neck supple.   Cardiovascular: Normal rate, regular rhythm, normal heart sounds and intact distal pulses.   Pulmonary/Chest: Effort normal and breath sounds normal. Right breast exhibits no inverted nipple, no mass, no nipple discharge, no skin change and no tenderness. Left breast exhibits mass. Left breast exhibits no inverted nipple, no nipple discharge, no skin change and no tenderness.   Abdominal: Soft. Bowel sounds are normal.   Musculoskeletal: Normal range of motion.   Neurological: She is alert and oriented to person, place, and time. She has normal reflexes.   Skin: Skin is warm and dry.   Psychiatric: She has a normal mood and affect. Her behavior is normal. Thought content normal.   Vitals reviewed.      Assessment:       1. Malignant neoplasm of upper-outer quadrant of left female breast, unspecified estrogen receptor status        Plan:       I will  refer her to plastic surgery for evaluation of reconstruction following mastectomy on the left.  The risks were explained and she agrees to proceed after seeing plastics

## 2019-01-02 ENCOUNTER — TELEPHONE (OUTPATIENT)
Dept: SURGERY | Facility: CLINIC | Age: 49
End: 2019-01-02

## 2019-01-02 NOTE — TELEPHONE ENCOUNTER
Pt notified of appt with  1/10/19 [place] [phone]-Dr. Rowell's office will contact pt with appt time        ----- Message from Ministerio Ochoa sent at 1/2/2019  9:31 AM CST -----  Contact: Self/[phone]  Patient would like to know if the staff reached out to her Plastic Surgeon. Thank you.

## 2019-01-10 ENCOUNTER — TELEPHONE (OUTPATIENT)
Dept: SURGERY | Facility: CLINIC | Age: 49
End: 2019-01-10

## 2019-01-10 DIAGNOSIS — C50.412 MALIGNANT NEOPLASM OF UPPER-OUTER QUADRANT OF LEFT FEMALE BREAST, UNSPECIFIED ESTROGEN RECEPTOR STATUS: Primary | ICD-10-CM

## 2019-01-10 DIAGNOSIS — C50.412 CARCINOMA OF BREAST UPPER OUTER QUADRANT, LEFT: ICD-10-CM

## 2019-01-10 RX ORDER — LIDOCAINE HYDROCHLORIDE 10 MG/ML
1 INJECTION, SOLUTION EPIDURAL; INFILTRATION; INTRACAUDAL; PERINEURAL ONCE
Status: CANCELLED | OUTPATIENT
Start: 2019-01-10 | End: 2019-01-10

## 2019-01-10 RX ORDER — SODIUM CHLORIDE 9 MG/ML
INJECTION, SOLUTION INTRAVENOUS CONTINUOUS
Status: CANCELLED | OUTPATIENT
Start: 2019-01-10

## 2019-01-11 ENCOUNTER — TELEPHONE (OUTPATIENT)
Dept: RADIOLOGY | Facility: HOSPITAL | Age: 49
End: 2019-01-11

## 2019-01-14 ENCOUNTER — TELEPHONE (OUTPATIENT)
Dept: SURGERY | Facility: CLINIC | Age: 49
End: 2019-01-14

## 2019-01-14 NOTE — TELEPHONE ENCOUNTER
----- Message from Alexandra vitalygeorgecolby sent at 1/14/2019 12:13 PM CST -----  Contact: self - [phone]  Pt states she missed a call from staff. Please contact back at earliest convenience.

## 2019-01-14 NOTE — TELEPHONE ENCOUNTER
----- Message from Airam Gracia sent at 1/14/2019  2:04 PM CST -----  Contact: Gracie from [facility]  [phone]   Gracie from [facility] is requesting a call back from  in regards to the patient's short term disability. They would like the paperwork faxed over for the patient's short term disability and condition after surgery. Please call at your earliest convenience.

## 2019-01-30 ENCOUNTER — HOSPITAL ENCOUNTER (OUTPATIENT)
Dept: RADIOLOGY | Facility: HOSPITAL | Age: 49
Discharge: HOME | End: 2019-01-30
Attending: SURGERY
Payer: MEDICAID

## 2019-01-30 ENCOUNTER — HOSPITAL ENCOUNTER (OUTPATIENT)
Dept: PREADMISSION TESTING | Facility: HOSPITAL | Age: 49
Discharge: HOME | End: 2019-01-30
Attending: SURGERY
Payer: MEDICAID

## 2019-01-30 VITALS
BODY MASS INDEX: 43 KG/M2 | HEART RATE: 60 BPM | SYSTOLIC BLOOD PRESSURE: 133 MMHG | TEMPERATURE: 98 F | HEIGHT: 62 IN | OXYGEN SATURATION: 97 % | WEIGHT: 233.69 LBS | RESPIRATION RATE: 16 BRPM | DIASTOLIC BLOOD PRESSURE: 89 MMHG

## 2019-01-30 DIAGNOSIS — C50.412 MALIGNANT NEOPLASM OF UPPER-OUTER QUADRANT OF LEFT FEMALE BREAST, UNSPECIFIED ESTROGEN RECEPTOR STATUS: ICD-10-CM

## 2019-01-30 DIAGNOSIS — Z01.818 PRE-OP TESTING: Primary | ICD-10-CM

## 2019-01-30 LAB
ALBUMIN SERPL BCP-MCNC: 3.9 G/DL
ALP SERPL-CCNC: 76 U/L
ALT SERPL W/O P-5'-P-CCNC: 85 U/L
ANION GAP SERPL CALC-SCNC: 7 MMOL/L
AST SERPL-CCNC: 56 U/L
BASOPHILS # BLD AUTO: 0.02 K/UL
BASOPHILS NFR BLD: 0.3 %
BILIRUB SERPL-MCNC: 0.2 MG/DL
BUN SERPL-MCNC: 13 MG/DL
CALCIUM SERPL-MCNC: 10.2 MG/DL
CHLORIDE SERPL-SCNC: 108 MMOL/L
CO2 SERPL-SCNC: 27 MMOL/L
CREAT SERPL-MCNC: 0.8 MG/DL
DIFFERENTIAL METHOD: NORMAL
EOSINOPHIL # BLD AUTO: 0.1 K/UL
EOSINOPHIL NFR BLD: 1.8 %
ERYTHROCYTE [DISTWIDTH] IN BLOOD BY AUTOMATED COUNT: 12.5 %
EST. GFR  (AFRICAN AMERICAN): >60 ML/MIN/1.73 M^2
EST. GFR  (NON AFRICAN AMERICAN): >60 ML/MIN/1.73 M^2
GLUCOSE SERPL-MCNC: 95 MG/DL
HCT VFR BLD AUTO: 45.6 %
HGB BLD-MCNC: 15.1 G/DL
LYMPHOCYTES # BLD AUTO: 1.8 K/UL
LYMPHOCYTES NFR BLD: 30.2 %
MCH RBC QN AUTO: 30.7 PG
MCHC RBC AUTO-ENTMCNC: 33.1 G/DL
MCV RBC AUTO: 93 FL
MONOCYTES # BLD AUTO: 0.6 K/UL
MONOCYTES NFR BLD: 9.5 %
NEUTROPHILS # BLD AUTO: 3.5 K/UL
NEUTROPHILS NFR BLD: 58.2 %
PLATELET # BLD AUTO: 197 K/UL
PMV BLD AUTO: 11 FL
POTASSIUM SERPL-SCNC: 4.6 MMOL/L
PROT SERPL-MCNC: 7.4 G/DL
RBC # BLD AUTO: 4.92 M/UL
SODIUM SERPL-SCNC: 142 MMOL/L
WBC # BLD AUTO: 5.99 K/UL

## 2019-01-30 PROCEDURE — 80053 COMPREHEN METABOLIC PANEL: CPT

## 2019-01-30 PROCEDURE — 71046 X-RAY EXAM CHEST 2 VIEWS: CPT | Mod: 26,,, | Performed by: RADIOLOGY

## 2019-01-30 PROCEDURE — 71046 XR CHEST PA AND LATERAL PRE-OP: ICD-10-PCS | Mod: 26,,, | Performed by: RADIOLOGY

## 2019-01-30 PROCEDURE — 71046 X-RAY EXAM CHEST 2 VIEWS: CPT | Mod: TC,FY

## 2019-01-30 PROCEDURE — 85025 COMPLETE CBC W/AUTO DIFF WBC: CPT

## 2019-01-30 PROCEDURE — 36415 COLL VENOUS BLD VENIPUNCTURE: CPT

## 2019-01-30 NOTE — DISCHARGE INSTRUCTIONS
Your surgery is scheduled for _Wednesday Feb. 6, 2019___.    Call [phone] between 2 p.m. and 5 p.m. on   _Tuesday_ to find out your arrival time for the day of your surgery.      Please report to SAME DAY SURGERY UNIT on the 2nd FLOOR at _______ a.m.  Use front door entrance. The doors open at 0530 am.        INSTRUCTIONS IMPORTANT!!!  ¨ Do not eat or drink after 12 midnight-including water. OK to brush teeth, no   gum, candy or mints!      _x___  Prep instructions:   SHOWER     _x___  Please shower using Hibiclens soap the night before AND  the morning of your surgery/procedure. Do not use Hibiclens on your face or genitals               If your surgery is around your belly button (Navel) be sure to wash inside your  belly button also. Rinse hibiclens off completely.  _x___  No shaving of procedural area at least 4-5 days before surgery due to  increased risk of skin irritation and/or possible infection.  _x___  Do not wear makeup, including mascara. WEARING EYE MAKEUP MAY LEAD TO SERIOUS EYE INJURY during surgery.  _x___  No powder, lotions or creams to your body.  _x___  You may wear only deodorant on the day of surgery.  _x___  Please remove all jewelry, including piercings and leave at home.  _x___  No money or valuables needed. Please leave at home.  You may bring your  cell phone.  ____  Please bring any documents given by your doctor.  _x___  If going home the same day, arrange for a ride home. You will not be able to drive if Anesthesia was used.  ____  Children under 18 years require a parent / guardian present the entire time   they are in surgery / recovery.  _x___  Wear loose fitting clothing. Allow for dressings, bandages.  _x___  Stop Aspirin, Ibuprofen, Motrin and Aleve at least 3-5 days before  surgery, unless otherwise instructed by your doctor, or the nurse.              You MAY use Tylenol/acetaminophen until day of surgery.  ____  If you take diabetic medication, do not take am of surgery  unless instructed by Doctor.  _x___  Call MD for temperature above 101 degrees.        _x___ Stop taking any Fish Oil supplement or any Vitamins that contain Vitamin  E at least 5 days prior to surgery.            I have read or had read and explained to me, and understand the above information.  Additional comments or instructions:Please call   [phone] if you have any questions regarding the instructions above.

## 2019-02-05 ENCOUNTER — ANESTHESIA EVENT (OUTPATIENT)
Dept: SURGERY | Facility: HOSPITAL | Age: 49
End: 2019-02-05
Payer: MEDICAID

## 2019-02-06 ENCOUNTER — ANESTHESIA (OUTPATIENT)
Dept: SURGERY | Facility: HOSPITAL | Age: 49
End: 2019-02-06
Payer: MEDICAID

## 2019-02-06 ENCOUNTER — HOSPITAL ENCOUNTER (OUTPATIENT)
Facility: HOSPITAL | Age: 49
Discharge: HOME | End: 2019-02-07
Attending: SURGERY | Admitting: SURGERY
Payer: MEDICAID

## 2019-02-06 DIAGNOSIS — C50.412 CARCINOMA OF BREAST UPPER OUTER QUADRANT, LEFT: ICD-10-CM

## 2019-02-06 DIAGNOSIS — C50.412 MALIGNANT NEOPLASM OF UPPER-OUTER QUADRANT OF LEFT FEMALE BREAST, UNSPECIFIED ESTROGEN RECEPTOR STATUS: Primary | ICD-10-CM

## 2019-02-06 PROCEDURE — 36000707: Performed by: SURGERY

## 2019-02-06 PROCEDURE — 25000242 PHARM REV CODE 250 ALT 637 W/ HCPCS: Performed by: REGISTERED NURSE

## 2019-02-06 PROCEDURE — 88341 IMHCHEM/IMCYTCHM EA ADD ANTB: CPT | Mod: 26,,, | Performed by: PATHOLOGY

## 2019-02-06 PROCEDURE — D9220A PRA ANESTHESIA: Mod: ANES,,, | Performed by: ANESTHESIOLOGY

## 2019-02-06 PROCEDURE — 25000003 PHARM REV CODE 250: Performed by: SURGERY

## 2019-02-06 PROCEDURE — C1729 CATH, DRAINAGE: HCPCS | Performed by: SURGERY

## 2019-02-06 PROCEDURE — D9220A PRA ANESTHESIA: ICD-10-PCS | Mod: ANES,,, | Performed by: ANESTHESIOLOGY

## 2019-02-06 PROCEDURE — 63600175 PHARM REV CODE 636 W HCPCS: Performed by: ANESTHESIOLOGY

## 2019-02-06 PROCEDURE — C1789 PROSTHESIS, BREAST, IMP: HCPCS | Performed by: SURGERY

## 2019-02-06 PROCEDURE — 38525 BIOPSY/REMOVAL LYMPH NODES: CPT | Mod: 51,LT,, | Performed by: SURGERY

## 2019-02-06 PROCEDURE — 88331 TISSUE SPECIMEN TO PATHOLOGY - SURGERY: ICD-10-PCS | Mod: 26,,, | Performed by: PATHOLOGY

## 2019-02-06 PROCEDURE — 38525 PR BIOPSY/REM LYMPH NODES, AXILLARY: ICD-10-PCS | Mod: 51,LT,, | Performed by: SURGERY

## 2019-02-06 PROCEDURE — 88342 IMHCHEM/IMCYTCHM 1ST ANTB: CPT | Performed by: PATHOLOGY

## 2019-02-06 PROCEDURE — 63600175 PHARM REV CODE 636 W HCPCS: Mod: JG | Performed by: SURGERY

## 2019-02-06 PROCEDURE — 88331 PATH CONSLTJ SURG 1 BLK 1SPC: CPT | Performed by: PATHOLOGY

## 2019-02-06 PROCEDURE — 88342 IMHCHEM/IMCYTCHM 1ST ANTB: CPT | Mod: 26,,, | Performed by: PATHOLOGY

## 2019-02-06 PROCEDURE — 88307 TISSUE SPECIMEN TO PATHOLOGY - SURGERY: ICD-10-PCS | Mod: 26,,, | Performed by: PATHOLOGY

## 2019-02-06 PROCEDURE — 37000008 HC ANESTHESIA 1ST 15 MINUTES: Performed by: SURGERY

## 2019-02-06 PROCEDURE — 88309 TISSUE SPECIMEN TO PATHOLOGY - SURGERY: ICD-10-PCS | Mod: 26,,, | Performed by: PATHOLOGY

## 2019-02-06 PROCEDURE — 36000706: Performed by: SURGERY

## 2019-02-06 PROCEDURE — 63600175 PHARM REV CODE 636 W HCPCS: Performed by: REGISTERED NURSE

## 2019-02-06 PROCEDURE — 63600175 PHARM REV CODE 636 W HCPCS: Performed by: PLASTIC SURGERY

## 2019-02-06 PROCEDURE — 19303 PR MASTECTOMY, SIMPLE, COMPLETE: ICD-10-PCS | Mod: LT,,, | Performed by: SURGERY

## 2019-02-06 PROCEDURE — 00402 ANES INTEG SYS RCNSTV BREAST: CPT | Performed by: SURGERY

## 2019-02-06 PROCEDURE — 71000033 HC RECOVERY, INTIAL HOUR: Performed by: SURGERY

## 2019-02-06 PROCEDURE — 88309 TISSUE EXAM BY PATHOLOGIST: CPT | Mod: 26,,, | Performed by: PATHOLOGY

## 2019-02-06 PROCEDURE — 37000009 HC ANESTHESIA EA ADD 15 MINS: Performed by: SURGERY

## 2019-02-06 PROCEDURE — 25000003 PHARM REV CODE 250: Performed by: PLASTIC SURGERY

## 2019-02-06 PROCEDURE — 19303 MAST SIMPLE COMPLETE: CPT | Mod: LT,,, | Performed by: SURGERY

## 2019-02-06 PROCEDURE — 63600175 PHARM REV CODE 636 W HCPCS: Performed by: SURGERY

## 2019-02-06 PROCEDURE — 71000039 HC RECOVERY, EACH ADD'L HOUR: Performed by: SURGERY

## 2019-02-06 PROCEDURE — 88341 TISSUE SPECIMEN TO PATHOLOGY - SURGERY: ICD-10-PCS | Mod: 26,,, | Performed by: PATHOLOGY

## 2019-02-06 PROCEDURE — 88342 TISSUE SPECIMEN TO PATHOLOGY - SURGERY: ICD-10-PCS | Mod: 26,,, | Performed by: PATHOLOGY

## 2019-02-06 PROCEDURE — C9290 INJ, BUPIVACAINE LIPOSOME: HCPCS | Performed by: SURGERY

## 2019-02-06 PROCEDURE — 88307 TISSUE EXAM BY PATHOLOGIST: CPT | Mod: 26,,, | Performed by: PATHOLOGY

## 2019-02-06 PROCEDURE — 25000003 PHARM REV CODE 250: Performed by: REGISTERED NURSE

## 2019-02-06 PROCEDURE — 27201423 OPTIME MED/SURG SUP & DEVICES STERILE SUPPLY: Performed by: SURGERY

## 2019-02-06 PROCEDURE — D9220A PRA ANESTHESIA: ICD-10-PCS | Mod: CRNA,,, | Performed by: REGISTERED NURSE

## 2019-02-06 PROCEDURE — 88331 PATH CONSLTJ SURG 1 BLK 1SPC: CPT | Mod: 26,,, | Performed by: PATHOLOGY

## 2019-02-06 PROCEDURE — D9220A PRA ANESTHESIA: Mod: CRNA,,, | Performed by: REGISTERED NURSE

## 2019-02-06 PROCEDURE — S0020 INJECTION, BUPIVICAINE HYDRO: HCPCS | Performed by: SURGERY

## 2019-02-06 DEVICE — IMPLANTABLE DEVICE: Type: IMPLANTABLE DEVICE | Site: BREAST | Status: FUNCTIONAL

## 2019-02-06 DEVICE — MATRIX ALLODERM THK PERF 16X20: Type: IMPLANTABLE DEVICE | Site: BREAST | Status: FUNCTIONAL

## 2019-02-06 RX ORDER — LOSARTAN POTASSIUM 25 MG/1
50 TABLET ORAL DAILY
Status: DISCONTINUED | OUTPATIENT
Start: 2019-02-06 | End: 2019-02-06 | Stop reason: HOSPADM

## 2019-02-06 RX ORDER — DIPHENHYDRAMINE HYDROCHLORIDE 50 MG/ML
25 INJECTION INTRAMUSCULAR; INTRAVENOUS EVERY 4 HOURS PRN
Status: DISCONTINUED | OUTPATIENT
Start: 2019-02-06 | End: 2019-02-06 | Stop reason: HOSPADM

## 2019-02-06 RX ORDER — ENOXAPARIN SODIUM 100 MG/ML
40 INJECTION SUBCUTANEOUS EVERY 24 HOURS
Status: DISCONTINUED | OUTPATIENT
Start: 2019-02-06 | End: 2019-02-06 | Stop reason: HOSPADM

## 2019-02-06 RX ORDER — ISOSULFAN BLUE 50 MG/5ML
INJECTION, SOLUTION SUBCUTANEOUS
Status: DISCONTINUED | OUTPATIENT
Start: 2019-02-06 | End: 2019-02-06 | Stop reason: HOSPADM

## 2019-02-06 RX ORDER — RAMELTEON 8 MG/1
8 TABLET ORAL NIGHTLY PRN
Status: DISCONTINUED | OUTPATIENT
Start: 2019-02-06 | End: 2019-02-06 | Stop reason: HOSPADM

## 2019-02-06 RX ORDER — GLYCOPYRROLATE 0.2 MG/ML
INJECTION INTRAMUSCULAR; INTRAVENOUS
Status: DISCONTINUED | OUTPATIENT
Start: 2019-02-06 | End: 2019-02-06

## 2019-02-06 RX ORDER — ACETAMINOPHEN 10 MG/ML
1000 INJECTION, SOLUTION INTRAVENOUS ONCE
Status: COMPLETED | OUTPATIENT
Start: 2019-02-06 | End: 2019-02-06

## 2019-02-06 RX ORDER — MORPHINE SULFATE 10 MG/ML
3 INJECTION INTRAMUSCULAR; INTRAVENOUS; SUBCUTANEOUS
Status: DISCONTINUED | OUTPATIENT
Start: 2019-02-06 | End: 2019-02-07

## 2019-02-06 RX ORDER — NEOSTIGMINE METHYLSULFATE 1 MG/ML
INJECTION, SOLUTION INTRAVENOUS
Status: DISCONTINUED | OUTPATIENT
Start: 2019-02-06 | End: 2019-02-06

## 2019-02-06 RX ORDER — ALBUTEROL SULFATE 90 UG/1
AEROSOL, METERED RESPIRATORY (INHALATION)
Status: DISCONTINUED | OUTPATIENT
Start: 2019-02-06 | End: 2019-02-06

## 2019-02-06 RX ORDER — ACETAMINOPHEN 325 MG/1
650 TABLET ORAL EVERY 4 HOURS PRN
Status: DISCONTINUED | OUTPATIENT
Start: 2019-02-06 | End: 2019-02-06

## 2019-02-06 RX ORDER — SODIUM CHLORIDE 9 MG/ML
INJECTION, SOLUTION INTRAVENOUS CONTINUOUS
Status: DISCONTINUED | OUTPATIENT
Start: 2019-02-06 | End: 2019-02-07

## 2019-02-06 RX ORDER — ONDANSETRON 8 MG/1
8 TABLET, ORALLY DISINTEGRATING ORAL EVERY 8 HOURS PRN
Status: DISCONTINUED | OUTPATIENT
Start: 2019-02-06 | End: 2019-02-06 | Stop reason: HOSPADM

## 2019-02-06 RX ORDER — PHENYLEPHRINE HYDROCHLORIDE 10 MG/ML
INJECTION INTRAVENOUS
Status: DISCONTINUED | OUTPATIENT
Start: 2019-02-06 | End: 2019-02-06

## 2019-02-06 RX ORDER — OXYCODONE AND ACETAMINOPHEN 5; 325 MG/1; MG/1
1 TABLET ORAL EVERY 4 HOURS PRN
Status: DISCONTINUED | OUTPATIENT
Start: 2019-02-06 | End: 2019-02-07 | Stop reason: HOSPADM

## 2019-02-06 RX ORDER — SODIUM CHLORIDE, SODIUM LACTATE, POTASSIUM CHLORIDE, CALCIUM CHLORIDE 600; 310; 30; 20 MG/100ML; MG/100ML; MG/100ML; MG/100ML
INJECTION, SOLUTION INTRAVENOUS CONTINUOUS PRN
Status: DISCONTINUED | OUTPATIENT
Start: 2019-02-06 | End: 2019-02-06

## 2019-02-06 RX ORDER — METOCLOPRAMIDE HYDROCHLORIDE 5 MG/ML
INJECTION INTRAMUSCULAR; INTRAVENOUS
Status: DISCONTINUED | OUTPATIENT
Start: 2019-02-06 | End: 2019-02-06

## 2019-02-06 RX ORDER — ONDANSETRON 2 MG/ML
4 INJECTION INTRAMUSCULAR; INTRAVENOUS DAILY PRN
Status: DISCONTINUED | OUTPATIENT
Start: 2019-02-06 | End: 2019-02-06 | Stop reason: HOSPADM

## 2019-02-06 RX ORDER — DEXAMETHASONE SODIUM PHOSPHATE 4 MG/ML
INJECTION, SOLUTION INTRA-ARTICULAR; INTRALESIONAL; INTRAMUSCULAR; INTRAVENOUS; SOFT TISSUE
Status: DISCONTINUED | OUTPATIENT
Start: 2019-02-06 | End: 2019-02-06

## 2019-02-06 RX ORDER — BUPIVACAINE HYDROCHLORIDE 2.5 MG/ML
INJECTION, SOLUTION INFILTRATION; PERINEURAL
Status: DISCONTINUED | OUTPATIENT
Start: 2019-02-06 | End: 2019-02-06 | Stop reason: HOSPADM

## 2019-02-06 RX ORDER — ACETAMINOPHEN 325 MG/1
650 TABLET ORAL EVERY 8 HOURS PRN
Status: DISCONTINUED | OUTPATIENT
Start: 2019-02-06 | End: 2019-02-06 | Stop reason: HOSPADM

## 2019-02-06 RX ORDER — FENTANYL CITRATE 50 UG/ML
INJECTION, SOLUTION INTRAMUSCULAR; INTRAVENOUS
Status: DISCONTINUED | OUTPATIENT
Start: 2019-02-06 | End: 2019-02-06

## 2019-02-06 RX ORDER — SODIUM CHLORIDE 0.9 % (FLUSH) 0.9 %
3 SYRINGE (ML) INJECTION
Status: DISCONTINUED | OUTPATIENT
Start: 2019-02-06 | End: 2019-02-06 | Stop reason: HOSPADM

## 2019-02-06 RX ORDER — SODIUM CHLORIDE 9 MG/ML
INJECTION, SOLUTION INTRAVENOUS CONTINUOUS
Status: DISCONTINUED | OUTPATIENT
Start: 2019-02-06 | End: 2019-02-06

## 2019-02-06 RX ORDER — HYDROCODONE BITARTRATE AND ACETAMINOPHEN 5; 325 MG/1; MG/1
1 TABLET ORAL EVERY 4 HOURS PRN
Status: DISCONTINUED | OUTPATIENT
Start: 2019-02-06 | End: 2019-02-06 | Stop reason: HOSPADM

## 2019-02-06 RX ORDER — MIDAZOLAM HYDROCHLORIDE 1 MG/ML
INJECTION, SOLUTION INTRAMUSCULAR; INTRAVENOUS
Status: DISCONTINUED | OUTPATIENT
Start: 2019-02-06 | End: 2019-02-06

## 2019-02-06 RX ORDER — CEFAZOLIN SODIUM 2 G/50ML
2 SOLUTION INTRAVENOUS
Status: DISCONTINUED | OUTPATIENT
Start: 2019-02-06 | End: 2019-02-06

## 2019-02-06 RX ORDER — LIDOCAINE HCL/PF 100 MG/5ML
SYRINGE (ML) INTRAVENOUS
Status: DISCONTINUED | OUTPATIENT
Start: 2019-02-06 | End: 2019-02-06

## 2019-02-06 RX ORDER — OXYCODONE AND ACETAMINOPHEN 5; 325 MG/1; MG/1
1 TABLET ORAL EVERY 4 HOURS PRN
Qty: 30 TABLET | Refills: 0 | Status: SHIPPED | OUTPATIENT
Start: 2019-02-06 | End: 2020-08-04

## 2019-02-06 RX ORDER — ACETAMINOPHEN 10 MG/ML
INJECTION, SOLUTION INTRAVENOUS
Status: DISCONTINUED | OUTPATIENT
Start: 2019-02-06 | End: 2019-02-06

## 2019-02-06 RX ORDER — PROPOFOL 10 MG/ML
VIAL (ML) INTRAVENOUS
Status: DISCONTINUED | OUTPATIENT
Start: 2019-02-06 | End: 2019-02-06

## 2019-02-06 RX ORDER — LIDOCAINE HYDROCHLORIDE 10 MG/ML
1 INJECTION, SOLUTION EPIDURAL; INFILTRATION; INTRACAUDAL; PERINEURAL ONCE
Status: DISCONTINUED | OUTPATIENT
Start: 2019-02-06 | End: 2019-02-06 | Stop reason: HOSPADM

## 2019-02-06 RX ORDER — SUCCINYLCHOLINE CHLORIDE 20 MG/ML
INJECTION INTRAMUSCULAR; INTRAVENOUS
Status: DISCONTINUED | OUTPATIENT
Start: 2019-02-06 | End: 2019-02-06

## 2019-02-06 RX ORDER — ONDANSETRON 2 MG/ML
INJECTION INTRAMUSCULAR; INTRAVENOUS
Status: DISCONTINUED | OUTPATIENT
Start: 2019-02-06 | End: 2019-02-06

## 2019-02-06 RX ORDER — HYDROMORPHONE HYDROCHLORIDE 2 MG/ML
0.2 INJECTION, SOLUTION INTRAMUSCULAR; INTRAVENOUS; SUBCUTANEOUS EVERY 5 MIN PRN
Status: DISCONTINUED | OUTPATIENT
Start: 2019-02-06 | End: 2019-02-06 | Stop reason: HOSPADM

## 2019-02-06 RX ORDER — OXYCODONE HYDROCHLORIDE 5 MG/1
10 TABLET ORAL EVERY 4 HOURS PRN
Status: DISCONTINUED | OUTPATIENT
Start: 2019-02-06 | End: 2019-02-06 | Stop reason: HOSPADM

## 2019-02-06 RX ORDER — CEFAZOLIN SODIUM 2 G/50ML
2 SOLUTION INTRAVENOUS
Status: DISCONTINUED | OUTPATIENT
Start: 2019-02-06 | End: 2019-02-06 | Stop reason: SDUPTHER

## 2019-02-06 RX ORDER — ROCURONIUM BROMIDE 10 MG/ML
INJECTION, SOLUTION INTRAVENOUS
Status: DISCONTINUED | OUTPATIENT
Start: 2019-02-06 | End: 2019-02-06

## 2019-02-06 RX ORDER — BACITRACIN 50000 [IU]/1
INJECTION, POWDER, FOR SOLUTION INTRAMUSCULAR
Status: DISCONTINUED | OUTPATIENT
Start: 2019-02-06 | End: 2019-02-06 | Stop reason: HOSPADM

## 2019-02-06 RX ADMIN — ROCURONIUM BROMIDE 30 MG: 10 INJECTION, SOLUTION INTRAVENOUS at 07:02

## 2019-02-06 RX ADMIN — ROCURONIUM BROMIDE 10 MG: 10 INJECTION, SOLUTION INTRAVENOUS at 09:02

## 2019-02-06 RX ADMIN — PHENYLEPHRINE HYDROCHLORIDE 100 MCG: 10 INJECTION INTRAVENOUS at 09:02

## 2019-02-06 RX ADMIN — FENTANYL CITRATE 50 MCG: 50 INJECTION INTRAMUSCULAR; INTRAVENOUS at 07:02

## 2019-02-06 RX ADMIN — ONDANSETRON 4 MG: 2 INJECTION, SOLUTION INTRAMUSCULAR; INTRAVENOUS at 09:02

## 2019-02-06 RX ADMIN — DEXAMETHASONE SODIUM PHOSPHATE 4 MG: 4 INJECTION, SOLUTION INTRAMUSCULAR; INTRAVENOUS at 09:02

## 2019-02-06 RX ADMIN — PROPOFOL 30 MG: 10 INJECTION, EMULSION INTRAVENOUS at 12:02

## 2019-02-06 RX ADMIN — LIDOCAINE HYDROCHLORIDE 80 MG: 20 INJECTION, SOLUTION INTRAVENOUS at 07:02

## 2019-02-06 RX ADMIN — CEFAZOLIN SODIUM 2 G: 2 SOLUTION INTRAVENOUS at 07:02

## 2019-02-06 RX ADMIN — PROPOFOL 170 MG: 10 INJECTION, EMULSION INTRAVENOUS at 07:02

## 2019-02-06 RX ADMIN — ALBUTEROL SULFATE 4 PUFF: 90 AEROSOL, METERED RESPIRATORY (INHALATION) at 12:02

## 2019-02-06 RX ADMIN — FENTANYL CITRATE 50 MCG: 50 INJECTION INTRAMUSCULAR; INTRAVENOUS at 12:02

## 2019-02-06 RX ADMIN — PHENYLEPHRINE HYDROCHLORIDE 100 MCG: 10 INJECTION INTRAVENOUS at 10:02

## 2019-02-06 RX ADMIN — GLYCOPYRROLATE 0.6 MG: 0.2 INJECTION, SOLUTION INTRAMUSCULAR; INTRAVENOUS at 12:02

## 2019-02-06 RX ADMIN — ACETAMINOPHEN 1000 MG: 10 INJECTION, SOLUTION INTRAVENOUS at 09:02

## 2019-02-06 RX ADMIN — SODIUM CHLORIDE, SODIUM LACTATE, POTASSIUM CHLORIDE, AND CALCIUM CHLORIDE: .6; .31; .03; .02 INJECTION, SOLUTION INTRAVENOUS at 06:02

## 2019-02-06 RX ADMIN — HYDROMORPHONE HYDROCHLORIDE 0.2 MG: 2 INJECTION, SOLUTION INTRAMUSCULAR; INTRAVENOUS; SUBCUTANEOUS at 01:02

## 2019-02-06 RX ADMIN — MIDAZOLAM HYDROCHLORIDE 2 MG: 1 INJECTION, SOLUTION INTRAMUSCULAR; INTRAVENOUS at 07:02

## 2019-02-06 RX ADMIN — SODIUM CHLORIDE: 0.9 INJECTION, SOLUTION INTRAVENOUS at 03:02

## 2019-02-06 RX ADMIN — SUCCINYLCHOLINE CHLORIDE 140 MG: 20 INJECTION, SOLUTION INTRAMUSCULAR; INTRAVENOUS at 07:02

## 2019-02-06 RX ADMIN — ROCURONIUM BROMIDE 20 MG: 10 INJECTION, SOLUTION INTRAVENOUS at 08:02

## 2019-02-06 RX ADMIN — NEOSTIGMINE METHYLSULFATE 3 MG: 1 INJECTION INTRAVENOUS at 12:02

## 2019-02-06 RX ADMIN — ROCURONIUM BROMIDE 15 MG: 10 INJECTION, SOLUTION INTRAVENOUS at 07:02

## 2019-02-06 RX ADMIN — ACETAMINOPHEN 1000 MG: 10 INJECTION, SOLUTION INTRAVENOUS at 03:02

## 2019-02-06 RX ADMIN — PHENYLEPHRINE HYDROCHLORIDE 50 MCG: 10 INJECTION INTRAVENOUS at 09:02

## 2019-02-06 RX ADMIN — FENTANYL CITRATE 25 MCG: 50 INJECTION INTRAMUSCULAR; INTRAVENOUS at 11:02

## 2019-02-06 RX ADMIN — METOCLOPRAMIDE 10 MG: 5 INJECTION, SOLUTION INTRAMUSCULAR; INTRAVENOUS at 09:02

## 2019-02-06 RX ADMIN — ROCURONIUM BROMIDE 5 MG: 10 INJECTION, SOLUTION INTRAVENOUS at 07:02

## 2019-02-06 RX ADMIN — MORPHINE SULFATE 3 MG: 10 INJECTION INTRAVENOUS at 10:02

## 2019-02-06 RX ADMIN — CEFAZOLIN SODIUM 2 G: 2 SOLUTION INTRAVENOUS at 11:02

## 2019-02-06 RX ADMIN — FENTANYL CITRATE 50 MCG: 50 INJECTION INTRAMUSCULAR; INTRAVENOUS at 08:02

## 2019-02-06 RX ADMIN — OXYCODONE AND ACETAMINOPHEN 1 TABLET: 5; 325 TABLET ORAL at 06:02

## 2019-02-06 NOTE — OP NOTE
DATE OF PROCEDURE:  02/06/2019.    PREOPERATIVE DIAGNOSIS:  Left breast CA.    POSTOPERATIVE DIAGNOSIS:  Left breast CA.    PROCEDURE PERFORMED:  Left breast total mastectomy with sentinel node   identification and biopsy followed by reconstruction with Plastic Surgery.    SURGEON:  Andre Fisher M.D.    ASSISTANT:  Isatu Bedolla M.D., PGY5.    ANESTHESIA:  General.    ESTIMATED BLOOD LOSS:  Minimal.    DESCRIPTION OF OPERATION:  The patient was brought to the Operating Room, placed   on the operating table in the supine position.  Under adequate general   anesthesia, prepped and draped around her chest in usual sterile fashion.  An   ellipse inferiorly was done around the nipple-areolar complex and some skin   flaps were formed superiorly to the clavicle, inferiorly to the inframammary   fold, medially to the sternum and laterally to latissimus dorsi.  The patient's   breast was taken off the chest wall superior, inferior, medial, lateral fashion   into the axilla where it was amputated and sent to pathology after it was   weighed.  The axilla was then dissected.  A blue node was identified.  It was   removed and sent to Pathology for analysis.  Hemostasis obtained with   electrocautery.  The wound was then turned over to Dr. Odonnell for further   treatment.      DEVANTE  dd: 02/06/2019 09:13:16 (CST)  td: 02/06/2019 10:02:40 (CST)  Doc ID   #[id number]  Job ID #[id number]    CC:

## 2019-02-06 NOTE — H&P
Subjective:       Patient ID: Augustina Bailey is a 48 y.o. female.     Chief Complaint: Recheck     HPI 47 yo female with breast cancer s/p neoadjuvant chemotherapy and now for scheduling of her mastectomy.  Review of Systems   Constitutional: Negative.    HENT: Negative.    Eyes: Negative.    Respiratory: Negative.    Cardiovascular: Negative.    Gastrointestinal: Negative.    Endocrine: Negative.    Musculoskeletal: Negative.    Skin: Negative.    Allergic/Immunologic: Negative.    Neurological: Negative.    Hematological: Negative.    Psychiatric/Behavioral: Negative.    All other systems reviewed and are negative.      Objective:   Physical Exam   Constitutional: She is oriented to person, place, and time. She appears well-developed and well-nourished.   HENT:   Head: Normocephalic and atraumatic.   Right Ear: External ear normal.   Left Ear: External ear normal.   Nose: Nose normal.   Mouth/Throat: Oropharynx is clear and moist.   Eyes: Conjunctivae and EOM are normal. Pupils are equal, round, and reactive to light.   Neck: Normal range of motion. Neck supple.   Cardiovascular: Normal rate, regular rhythm, normal heart sounds and intact distal pulses.   Pulmonary/Chest: Effort normal and breath sounds normal. Right breast exhibits no inverted nipple, no mass, no nipple discharge, no skin change and no tenderness. Left breast exhibits mass. Left breast exhibits no inverted nipple, no nipple discharge, no skin change and no tenderness.   Abdominal: Soft. Bowel sounds are normal.   Musculoskeletal: Normal range of motion.   Neurological: She is alert and oriented to person, place, and time. She has normal reflexes.   Skin: Skin is warm and dry.   Psychiatric: She has a normal mood and affect. Her behavior is normal. Thought content normal.   Vitals reviewed.      Assessment:       1. Malignant neoplasm of upper-outer quadrant of left female breast, unspecified estrogen receptor status        Plan:       I will refer  her to plastic surgery for evaluation of reconstruction following mastectomy on the left.  The risks were explained and she agrees to proceed after seeing plastics

## 2019-02-06 NOTE — TRANSFER OF CARE
"Anesthesia Transfer of Care Note    Patient: Augustina Bailey    Procedure(s) Performed: Procedure(s) (LRB):  MASTECTOMY, SIMPLE (Left)  BIOPSY, LYMPH NODE, SENTINEL (Left)  INJECTION, FOR SENTINEL NODE IDENTIFICATION (Left)  INSERTION, TISSUE EXPANDER, BREAST (Left)    Patient location: PACU    Anesthesia Type: general    Transport from OR: Transported from OR on room air with adequate spontaneous ventilation    Post pain: adequate analgesia    Post assessment: no apparent anesthetic complications    Post vital signs: stable    Level of consciousness: awake and alert    Nausea/Vomiting: no nausea/vomiting    Complications: none    Transfer of care protocol was followed      Last vitals:   Visit Vitals  BP (!) 101/55 (BP Location: Right arm, Patient Position: Lying)   Pulse 77   Temp 37 °C (98.6 °F) (Oral)   Resp 14   Ht 5' 2" (1.575 m)   Wt 106 kg (233 lb 11 oz)   SpO2 97%   Breastfeeding? No   BMI 42.74 kg/m²     "

## 2019-02-06 NOTE — BRIEF OP NOTE
[facility]  Brief Operative Note     SUMMARY     Surgery Date: 2/6/2019     Surgeon(s) and Role:  Panel 1:     * Andre Fisher MD - Primary     * Isatu Bedolla MD - Resident - Assisting  Panel 2:     * Sagar Odonnell MD - Primary  Kevin Krueger MD Assisting        Pre-op Diagnosis:  Malignant neoplasm of upper-outer quadrant of left female breast, unspecified estrogen receptor status [C50.412]    Post-op Diagnosis:  Post-Op Diagnosis Codes:     * Malignant neoplasm of upper-outer quadrant of left female breast, unspecified estrogen receptor status [C50.412]     * Malignant neoplasm of left female breast, unspecified estrogen receptor status, unspecified site of breast [C50.912]    Procedure(s) (LRB):  MASTECTOMY, SIMPLE (Left)  BIOPSY, LYMPH NODE, SENTINEL (Left)  INJECTION, FOR SENTINEL NODE IDENTIFICATION (Left)  INSERTION, TISSUE EXPANDER, BREAST (Left)    Anesthesia: General    Description of the findings of the procedure: After mastectomy completed, the wound was irregated and rechecked for bleeding.  A 15 cm template was placed in the mastectomy wound and marked.  A piece of ADM was then cut to fit the template with excess left inferiorly to tuck under.  The axilla was closed with 2.0 vicryl.  The ADM was then sewn into the edges of the defect with 2/0 vicryl.  The pocket was re-iregated and a posterior drain inserted.      The expander was then placed and the 4 and 8 O'clock tabs secured.  The inferior edge was turned over and repaired to the inframmary fold.  The skin was closed and the expander filled to 420 cc.  Mastectomy weight was 970 grams.      Findings/Key Components: Expander was Allergan 133MX-15T alfred mp,nohemi; 700 cc. Fill volume    Estimated Blood Loss: * No values recorded between 2/6/2019  8:08 AM and 2/6/2019 11:55 AM *       Dict  [id number]  Specimens:   Specimen (12h ago, onward)    Start     Ordered    02/06/19 1007  Specimen to Pathology - Surgery  Once     Comments:   LEFT BREAST     Start Status   02/06/19 1007 Collected (02/06/19 0900)       02/06/19 1007 02/06/19 0907  Specimen to Pathology - Surgery  Once     Comments:  LEFT AXILLARY SENTINEL LYMPH NODE     Start Status   02/06/19 0907 Collected (02/06/19 0805)       02/06/19 0907          Discharge Note    SUMMARY     Admit Date: 2/6/2019    Discharge Date and Time: No discharge date for patient encounter.    Hospital Course (synopsis of major diagnoses, care, treatment, and services provided during the course of the hospital stay): Patient had carcinoma left breast.  She underwent mastectomy by Dr. Fisher and immediate reconstruction with expander by Dr. Odonnell.  She tolerated the procedure well.  The sentinel node was negative on frozen section    Final Diagnosis: Post-Op Diagnosis Codes:     * Malignant neoplasm of upper-outer quadrant of left female breast, unspecified estrogen receptor status [C50.412]     * Malignant neoplasm of left female breast, unspecified estrogen receptor status, unspecified site of breast [C50.912]    Disposition: Home or Self Care    Follow Up/Patient Instructions: Patient may shower and shampoo tomorrow evening.  She should resume her regular diet.  She should empty the drains twice daily and record the amount.  She should come to the office this coming Monday afternoon.    Medications:  Reconciled Home Medications:      Medication List      START taking these medications    oxyCODONE-acetaminophen 5-325 mg per tablet  Commonly known as:  PERCOCET  Take 1 tablet by mouth every 4 (four) hours as needed for Pain.        ASK your doctor about these medications    losartan 50 MG tablet  Commonly known as:  COZAAR  Take 50 mg by mouth once daily.          Discharge Procedure Orders   Ambulatory Referral to External Surgery   Referral Priority: Routine Referral Type: Surgical   Requested Specialty: Surgery   Number of Visits Requested: 1     Diet general     Call MD for:  temperature >100.4      Call MD for:  persistent nausea and vomiting     Call MD for:  severe uncontrolled pain     Call MD for:  difficulty breathing, headache or visual disturbances     Call MD for:  redness, tenderness, or signs of infection (pain, swelling, redness, odor or green/yellow discharge around incision site)     Call MD for:  hives     Remove dressing in 24 hours     Shower on day dressing removed (No bath)

## 2019-02-06 NOTE — OP NOTE
DATE OF PROCEDURE:  02/06/2019.    PREOPERATIVE DIAGNOSIS:  Carcinoma, left breast.    POSTOPERATIVE DIAGNOSIS:  Carcinoma, left breast.    PROCEDURE:  Immediate reconstruction of left breast with expander and AlloDerm.    SURGEON:  Sagar Odonnell M.D.    ASSISTANT:  Evans.    PROCEDURE IN DETAIL:  The patient was brought to the Operating Room after being   marked in the preanesthetic area.  She then had a modified radical mastectomy   performed by Dr. Fisher, who will dictate separately.  Once the mastectomy was   completed, hemostasis was achieved.  The drapes were removed.  The patient was   reprepped and redraped.    The wound was then irrigated again copiously and the area where the axillary   node had been removed was repaired.    The template was then made 15 cm in diameter and this was placed within the   wound and then the edges of the template were marked with marking fluid on the   chest wall.    There was some repair, which had to be done laterally, but medially, inferiorly,   and superiorly, the template fit very well.    A piece of ADM was then cut to fit the upper portion of the template with the   lower portion being left in excess to turnover.    A 15 suction round drain was then placed through the axilla into the area   beneath the AlloDerm.  The AlloDerm was repaired to the chest wall with 2-0   Vicryl interrupted and running sutures.  The lower portion was obviously left   open for the insertion of the expander.  Expander was then soaked in bacitracin   solution.  It was placed into the pocket, smoothed out and the tabs at 4 o'clock   and 8 o'clock was sewn down to the chest wall.  The rest of the excess ADM was   then folded up into the implant and the inframammary fold area was repaired with   2-0 Vicryl.  Once this had been completed, the skin wound was closed with a   combination of 2-0 Vicryl and 3-0 Monocryl.    The implant was then filled percutaneously with 420 mL of saline.    A  Dermabond tape completed the closure.  The expander, which was used was an   Allergan 133MX-15-T nominal fill of 700 mL, serial #[id number].  The mastectomy   weight was 970 g.  The patient tolerated the procedure well.  Estimated blood   loss for my portion of the procedure was negligible.  She went back to the   Recovery Room in good condition.      CLD/IN  dd: 02/06/2019 12:10:52 (CST)  td: 02/06/2019 13:21:06 (CST)  Doc ID   #[id number]  Job ID #[id number]    CC:

## 2019-02-06 NOTE — ANESTHESIA PREPROCEDURE EVALUATION
02/06/2019  Augustina Bailey is a 48 y.o., female.    Anesthesia Evaluation         Review of Systems  Anesthesia Hx:  No previous Anesthesia   Social:  Non-Smoker    Hematology/Oncology:  Hematology Normal   Oncology Normal     EENT/Dental:EENT/Dental Normal   Cardiovascular:   Hypertension    Pulmonary:  Pulmonary Normal    Renal/:  Renal/ Normal     Hepatic/GI:  Hepatic/GI Normal    Musculoskeletal:  Musculoskeletal Normal    Neurological:  Neurology Normal    Endocrine:  Endocrine Normal    Dermatological:  Skin Normal    Psych:  Psychiatric Normal           Physical Exam  General:  Well nourished    Airway/Jaw/Neck:  Airway Findings: Mallampati: II TM Distance: < 4 cm      Dental:  DENTAL FINDINGS: Normal   Chest/Lungs:  Chest/Lungs Clear    Heart/Vascular:  Heart Findings: Normal       Mental Status:  Mental Status Findings:  Cooperative, Alert and Oriented         Anesthesia Plan  Type of Anesthesia, risks & benefits discussed:  Anesthesia Type:  general  Patient's Preference:   Intra-op Monitoring Plan: standard ASA monitors  Intra-op Monitoring Plan Comments:   Post Op Pain Control Plan: multimodal analgesia, IV/PO Opioids PRN and per primary service following discharge from PACU  Post Op Pain Control Plan Comments:   Induction:    Beta Blocker:  Patient is not currently on a Beta-Blocker (No further documentation required).       Informed Consent: Patient understands risks and agrees with Anesthesia plan.  Questions answered. Anesthesia consent signed with patient.  ASA Score: 3     Day of Surgery Review of History & Physical:    H&P update referred to the provider.  H&P completed by Anesthesiologist.   Anesthesia Plan Notes: npo        Ready For Surgery From Anesthesia Perspective.

## 2019-02-06 NOTE — BRIEF OP NOTE
[facility]  Brief Operative Note     SUMMARY     Surgery Date: 2/6/2019     Surgeon(s) and Role:  Panel 1:     * Andre Fisher MD - Primary     * Isatu Bedolla MD - Resident - Assisting  Panel 2:     * Sagar Odonnell MD - Primary        Pre-op Diagnosis:  Malignant neoplasm of upper-outer quadrant of left female breast, unspecified estrogen receptor status [C50.412]    Post-op Diagnosis:  Post-Op Diagnosis Codes:     * Malignant neoplasm of upper-outer quadrant of left female breast, unspecified estrogen receptor status [C50.412]     * Malignant neoplasm of left female breast, unspecified estrogen receptor status, unspecified site of breast [C50.912]    Procedure(s) (LRB):  MASTECTOMY, SIMPLE (Left)  BIOPSY, LYMPH NODE, SENTINEL (Left)  INJECTION, FOR SENTINEL NODE IDENTIFICATION (Left)  INSERTION, TISSUE EXPANDER, BREAST (Left)    Anesthesia: General    Description of the findings of the procedure: negative     Findings/Key Components: normal anatomy    Estimated Blood Loss: minimal         Specimens:   Specimen (12h ago, onward)    Start     Ordered    02/06/19 0907  Specimen to Pathology - Surgery  Once     Comments:  LEFT AXILLARY SENTINEL LYMPH NODE     Start Status   02/06/19 0907 Collected (02/06/19 0805)       02/06/19 0907          Discharge Note    SUMMARY     Admit Date: 2/6/2019    Discharge Date and Time:  02/06/2019 9:10 AM    Hospital Course (synopsis of major diagnoses, care, treatment, and services provided during the course of the hospital stay): uneventful post op course     Final Diagnosis: Post-Op Diagnosis Codes:     * Malignant neoplasm of upper-outer quadrant of left female breast, unspecified estrogen receptor status [C50.412]     * Malignant neoplasm of left female breast, unspecified estrogen receptor status, unspecified site of breast [C50.912]    Disposition: Home or Self Care    Follow Up/Patient Instructions:     Medications:  Reconciled Home Medications:       Medication List      START taking these medications    oxyCODONE-acetaminophen 5-325 mg per tablet  Commonly known as:  PERCOCET  Take 1 tablet by mouth every 4 (four) hours as needed for Pain.        ASK your doctor about these medications    losartan 50 MG tablet  Commonly known as:  COZAAR  Take 50 mg by mouth once daily.          Discharge Procedure Orders   Ambulatory Referral to External Surgery   Referral Priority: Routine Referral Type: Surgical   Requested Specialty: Surgery   Number of Visits Requested: 1     Diet general     Call MD for:  temperature >100.4     Call MD for:  persistent nausea and vomiting     Call MD for:  severe uncontrolled pain     Call MD for:  difficulty breathing, headache or visual disturbances     Call MD for:  redness, tenderness, or signs of infection (pain, swelling, redness, odor or green/yellow discharge around incision site)     Call MD for:  hives     Remove dressing in 24 hours     Shower on day dressing removed (No bath)

## 2019-02-07 ENCOUNTER — TELEPHONE (OUTPATIENT)
Dept: SURGERY | Facility: CLINIC | Age: 49
End: 2019-02-07

## 2019-02-07 VITALS
DIASTOLIC BLOOD PRESSURE: 64 MMHG | OXYGEN SATURATION: 96 % | RESPIRATION RATE: 18 BRPM | SYSTOLIC BLOOD PRESSURE: 113 MMHG | WEIGHT: 233.69 LBS | BODY MASS INDEX: 43 KG/M2 | TEMPERATURE: 97 F | HEIGHT: 62 IN | HEART RATE: 58 BPM

## 2019-02-07 PROCEDURE — 25000003 PHARM REV CODE 250: Performed by: STUDENT IN AN ORGANIZED HEALTH CARE EDUCATION/TRAINING PROGRAM

## 2019-02-07 PROCEDURE — 25000003 PHARM REV CODE 250: Performed by: PLASTIC SURGERY

## 2019-02-07 RX ORDER — CEPHALEXIN 500 MG/1
500 CAPSULE ORAL EVERY 8 HOURS
Status: DISCONTINUED | OUTPATIENT
Start: 2019-02-07 | End: 2019-02-07 | Stop reason: HOSPADM

## 2019-02-07 RX ORDER — OXYCODONE AND ACETAMINOPHEN 10; 325 MG/1; MG/1
1 TABLET ORAL EVERY 4 HOURS PRN
Status: DISCONTINUED | OUTPATIENT
Start: 2019-02-07 | End: 2019-02-07 | Stop reason: HOSPADM

## 2019-02-07 RX ADMIN — OXYCODONE AND ACETAMINOPHEN 1 TABLET: 5; 325 TABLET ORAL at 04:02

## 2019-02-07 RX ADMIN — OXYCODONE HYDROCHLORIDE AND ACETAMINOPHEN 1 TABLET: 10; 325 TABLET ORAL at 08:02

## 2019-02-07 NOTE — PLAN OF CARE
Problem: Adult Inpatient Plan of Care  Goal: Patient-Specific Goal (Individualization)  Outcome: Ongoing (interventions implemented as appropriate)  VSS. Afebrile. Pain managed with PRN Percocet PO and morphine IVP. Ambulating to bathroom independently and without difficulty. Voiding CYU. Left breast vertical midline incision with abd pad in place. Incision well-approximated with sutures intact. No drainage noted.  Bra on. Left chest drainage bulb to suction. Intact and maintained. Draining sanguineous fluid. Left breast drainage bulb to suction. Intact and maintained. Draining sanguineous fluid. Positive bowel sounds x 4. Tolerating regular diet. RH IV saline locked. Compression socks on. POC discussed with patient and spouse and understanding voiced. POLI.

## 2019-02-07 NOTE — PROGRESS NOTES
Pt discharged to home per dr's orders.  Pt verbalized understanding of all discharge instructions including follow up appointment and medications.  No complaints.  No signs of distress.

## 2019-02-07 NOTE — PLAN OF CARE
Problem: Adult Inpatient Plan of Care  Goal: Plan of Care Review  Outcome: Ongoing (interventions implemented as appropriate)  POC discussed. Questions encouraged and answered. White board updated with Spectral[employer] number. Call light and white phone within reach. Bed in LL position with SR up x 2. Instructed to call with needs. Understanding verbalized.

## 2019-02-07 NOTE — PROGRESS NOTES
Patient is doing very well, no complaints other than a little soreness.  The breast flaps are viable and there is no evidence of hematoma.  Drain output as expected.    Patient OK for discharge to office Monday.    I have answered her questions.

## 2019-02-07 NOTE — PROGRESS NOTES
[facility]  General Surgery  Progress Note    Subjective:     Interval History: no acute events overnight. Notes incisional pain. Denies arm numbness or tingling. Tolerated clear liquids. Minimal ambulation.     Post-Op Info:  Procedure(s) (LRB):  MASTECTOMY, SIMPLE (Left)  BIOPSY, LYMPH NODE, SENTINEL (Left)  INJECTION, FOR SENTINEL NODE IDENTIFICATION (Left)  INSERTION, TISSUE EXPANDER, BREAST (Left)   1 Day Post-Op      Medications:  Continuous Infusions:  Scheduled Meds:   cephALEXin  500 mg Oral Q8H     PRN Meds:oxyCODONE-acetaminophen, oxyCODONE-acetaminophen, promethazine (PHENERGAN) IVPB     Objective:     Vital Signs (Most Recent):  Temp: 96.6 °F (35.9 °C) (02/07/19 0335)  Pulse: (!) 51 (02/07/19 0335)  Resp: 20 (02/07/19 0335)  BP: (!) 116/58 (02/07/19 0335)  SpO2: 96 % (02/06/19 1617) Vital Signs (24h Range):  Temp:  [96.6 °F (35.9 °C)-98.6 °F (37 °C)] 96.6 °F (35.9 °C)  Pulse:  [51-78] 51  Resp:  [13-20] 20  SpO2:  [94 %-98 %] 96 %  BP: ()/(51-77) 116/58       Intake/Output Summary (Last 24 hours) at 2/7/2019 0720  Last data filed at 2/7/2019 0411  Gross per 24 hour   Intake 3960 ml   Output 976 ml   Net 2984 ml       Physical Exam  Awake, alert, no distress  RRR  No increased work of breathing  Left breast with dressing in place, incision intact, cdi, drains in place with serosang.   Abd soft, non tender, non distended    Significant Labs:  BMP: No results for input(s): GLU, NA, K, CL, CO2, BUN, CREATININE, CALCIUM, MG in the last 48 hours.  CBC: No results for input(s): WBC, RBC, HGB, HCT, PLT, MCV, MCH, MCHC in the last 48 hours.    Significant Diagnostics:  None    Assessment/Plan:   48 year old woman s/p left mastectomy SLNBx and expander placement POD#1  Regular diet  PO pain meds  PO Keflex  Continue drains  Ambulate  Dc home today    Active Diagnoses:    Diagnosis Date Noted POA    PRINCIPAL PROBLEM:  Carcinoma of breast upper outer quadrant, left [C50.412] 02/06/2019  Yes      Problems Resolved During this Admission:         Joann Downey MD  General Surgery  [facility]

## 2019-02-07 NOTE — PROGRESS NOTES
"Plastic Surgery Progress Note    Subjective :   NAEO  Pain controlled with PO medication  Tolerating PO diet  Ambulatory  VSS    PMH:   Past Medical History:   Diagnosis Date    Anemia     Cancer     left breast    Encounter for blood transfusion     Heart murmur     Hypertension     Interstitial cystitis        PSH:   Past Surgical History:   Procedure Laterality Date    BREAST SURGERY      biopsy     SECTION      x3    CYSTOSCOPY, WITH RETROGRADE PYELOGRAM Bilateral 3/25/2013    [facility]    HERNIA REPAIR      HYDRODISTENTION  3/25/2013    [facility]    HYSTERECTOMY      still have ovaries    INSERTION, CATHETER  3/25/2013    [facility]    INSERTION, PORT-A-CATH N/A 2018    [facility] Andre [facility] at [facility]       Social Hx:   Social History     Occupational History    Not on file   Tobacco Use    Smoking status: Never Smoker    Smokeless tobacco: Never Used   Substance and Sexual Activity    Alcohol use: No    Drug use: No    Sexual activity: Yes     Partners: Male     Birth control/protection: Surgical       Medications:    No current facility-administered medications on file prior to encounter.      Current Outpatient Medications on File Prior to Encounter   Medication Sig Dispense Refill    losartan (COZAAR) 50 MG tablet Take 50 mg by mouth once daily.           PE:         Vitals:    19 0335   BP: (!) 116/58   Pulse: (!) 51   Resp: 20   Temp: 96.6 °F (35.9 °C)       Estimated body mass index is 42.74 kg/m² as calculated from the following:    Height as of this encounter: 5' 2" (1.575 m).    Weight as of this encounter: 106 kg (233 lb 11 oz).    General: AAOx3  HEENT: Atraumatic  Chest: CTAB, no wheezes noted  CV: RRR, no rubs, murmurs, or gallops noted  Breast: Bandages in place.  Wound clean dry and intact, no erythema or drainage noted.  Drains in " place (90cc and 55cc: 145cc total) sanguinous drainage noted.   Abdomen: Soft, non-tender, non-distended  Extremities:  dp 2+, no edema noted    Labs:    Lab Results   Component Value Date    WBC 5.99 01/30/2019    HGB 15.1 01/30/2019    HCT 45.6 01/30/2019    MCV 93 01/30/2019     01/30/2019       BMP  Lab Results   Component Value Date     01/30/2019    K 4.6 01/30/2019     01/30/2019    CO2 27 01/30/2019    BUN 13 01/30/2019    CREATININE 0.8 01/30/2019    CALCIUM 10.2 01/30/2019    ANIONGAP 7 (L) 01/30/2019    ESTGFRAFRICA >60 01/30/2019    EGFRNONAA >60 01/30/2019       Lab Results   Component Value Date    INR 1.0 12/08/2010    INR 1.0 12/07/2010    INR 1.0 12/06/2010       A/P  48 y.o.female s/p left mastectomy and sentinal node biopsy with breast expander placement POD#1    Pt stable, meeting all in-patient goals    - Clear for discharge from plastic surgery perspective  - Please have patient follow up with plastic surgery on Monday (2/11)  - Keep bandage in place until next appointment.    Fer Krueger MD   Plastic Surgery PGY-1

## 2019-02-08 NOTE — ANESTHESIA POSTPROCEDURE EVALUATION
"Anesthesia Post Evaluation    Patient: Augustina Bailey    Procedure(s) Performed: Procedure(s) (LRB):  MASTECTOMY, SIMPLE (Left)  BIOPSY, LYMPH NODE, SENTINEL (Left)  INJECTION, FOR SENTINEL NODE IDENTIFICATION (Left)  INSERTION, TISSUE EXPANDER, BREAST (Left)    Final Anesthesia Type: general  Patient location during evaluation: PACU  Patient participation: Yes- Able to Participate  Level of consciousness: awake and alert, oriented and awake  Post-procedure vital signs: reviewed and stable  Pain management: adequate  Airway patency: patent  PONV status at discharge: No PONV  Anesthetic complications: no      Cardiovascular status: blood pressure returned to baseline, hemodynamically stable and stable  Respiratory status: unassisted and spontaneous ventilation  Hydration status: euvolemic  Follow-up not needed.        Visit Vitals  /64 (BP Location: Right arm, Patient Position: Sitting)   Pulse (!) 58   Temp 36.3 °C (97.3 °F) (Oral)   Resp 18   Ht 5' 2" (1.575 m)   Wt 106 kg (233 lb 11 oz)   SpO2 96%   Breastfeeding? No   BMI 42.74 kg/m²       Pain/Rajinder Score: Pain Rating Prior to Med Admin: 7 (2/7/2019  8:07 AM)  Pain Rating Post Med Admin: 2 (2/7/2019  9:00 AM)        "

## 2019-02-13 NOTE — DISCHARGE SUMMARY
[facility]  Plastic Surgery  Discharge Summary      Patient Name: Augustina Bailey  MRN: [id number]  Admission Date: 2/6/2019  Hospital Length of Stay: 0 days  Discharge Date and Time: 2/7/2019  Attending Physician: Sagar Odonnell MD  Discharging Provider: Fer Krueger MD  Primary Care Provider: Hermilo Rosado MD     HPI: 47 yo F w/breast cancer s/p neoadjuvent chemotherapy presenting for elective mastectomy with sentinel lymph node biopsy and reconstruction of the left breast with insertion of an expander with AlloDerm    Procedure(s) (LRB):  MASTECTOMY, SIMPLE (Left)  BIOPSY, LYMPH NODE, SENTINEL (Left)  INJECTION, FOR SENTINEL NODE IDENTIFICATION (Left)  INSERTION, TISSUE EXPANDER, BREAST (Left)     Hospital Course: 47 yo F presented for an elective mastectomy with sentinel lymph node biopsy performed by Dr. Fisher, and reconstruction of the left breast with insertion of an expander with AlloDerm, performed by Dr. Odonnell.  She tolerated the procedures well, see the op-notes for more details.  Pt was admitted to extended recovery post-operatively for pain control and observation.  She had a benign post-op course and by post-op day 1 her pain was controlled with PO pain meds, she was ambulatory, and she was tolerating a regular diet.  She was evaluated as stable and ready for discharge.  Pt to be discharged with prophylactic antibiotics, and medication for pain control.  She will follow up at Dr. Odonnell's clinic on 2/11/2019.    Significant Diagnostic Studies: Lymph node biopsy - path pending    Pending Diagnostic Studies:     None        Final Active Diagnoses:    Diagnosis Date Noted POA    PRINCIPAL PROBLEM:  Carcinoma of breast upper outer quadrant, left [C50.412] 02/06/2019 Yes      Problems Resolved During this Admission:      Discharged Condition: good    Disposition: Home or Self Care    Follow Up:  Follow-up Information     Sagar Odonnell MD. Schedule an appointment as soon as  possible for a visit in 4 days.    Specialty:  Plastic Surgery  Contact information:  [address] [place] [address]  [address]  [place] [address]  [phone]                 Patient Instructions:      Ambulatory Referral to External Surgery   Referral Priority: Routine Referral Type: Surgical   Requested Specialty: Surgery   Number of Visits Requested: 1     Diet general     Call MD for:  temperature >100.4     Call MD for:  persistent nausea and vomiting     Call MD for:  severe uncontrolled pain     Call MD for:  difficulty breathing, headache or visual disturbances     Call MD for:  redness, tenderness, or signs of infection (pain, swelling, redness, odor or green/yellow discharge around incision site)     Call MD for:  hives     Remove dressing in 24 hours     Shower on day dressing removed (No bath)     Medications:  Reconciled Home Medications:      Medication List      START taking these medications    oxyCODONE-acetaminophen 5-325 mg per tablet  Commonly known as:  PERCOCET  Take 1 tablet by mouth every 4 (four) hours as needed for Pain.        ASK your doctor about these medications    losartan 50 MG tablet  Commonly known as:  COZAAR  Take 50 mg by mouth once daily.            Fer Krueger MD  Plastic Surgery  [facility]

## 2019-03-11 ENCOUNTER — TELEPHONE (OUTPATIENT)
Dept: SURGERY | Facility: CLINIC | Age: 49
End: 2019-03-11

## 2019-03-11 NOTE — TELEPHONE ENCOUNTER
----- Message from Deacon Cuellar sent at 3/11/2019  8:35 AM CDT -----  Contact: Self/[phone]  The patient would like to reschedule her post op appt. The next available is 04/02.            Thank you

## 2019-03-11 NOTE — TELEPHONE ENCOUNTER
Pt notified appt with Dr. Herrera r/s from 3/12/19 to 3/14/19 @ 11:00am    ----- Message from Shonda Thornton sent at 3/11/2019  8:52 AM CDT -----  Contact: Self/ [phone]  Patient returned staffs call.  Thank you

## 2019-03-14 ENCOUNTER — DOCUMENTATION ONLY (OUTPATIENT)
Dept: SURGERY | Facility: CLINIC | Age: 49
End: 2019-03-14

## 2019-03-14 ENCOUNTER — OFFICE VISIT (OUTPATIENT)
Dept: SURGERY | Facility: CLINIC | Age: 49
End: 2019-03-14
Payer: MEDICAID

## 2019-03-14 VITALS
HEART RATE: 77 BPM | HEIGHT: 62 IN | BODY MASS INDEX: 42.88 KG/M2 | DIASTOLIC BLOOD PRESSURE: 90 MMHG | OXYGEN SATURATION: 97 % | SYSTOLIC BLOOD PRESSURE: 133 MMHG | WEIGHT: 233 LBS

## 2019-03-14 DIAGNOSIS — C50.412 MALIGNANT NEOPLASM OF UPPER-OUTER QUADRANT OF LEFT FEMALE BREAST, UNSPECIFIED ESTROGEN RECEPTOR STATUS: Primary | ICD-10-CM

## 2019-03-14 PROCEDURE — 99024 POSTOP FOLLOW-UP VISIT: CPT | Mod: S$GLB,,, | Performed by: SURGERY

## 2019-03-14 PROCEDURE — 99024 PR POST-OP FOLLOW-UP VISIT: ICD-10-PCS | Mod: S$GLB,,, | Performed by: SURGERY

## 2019-03-14 NOTE — PROGRESS NOTES
Subjective:       Patient ID: Augustina Bailey is a 48 y.o. female.    Chief Complaint: Post-op Evaluation (Patient doing well she finished with chemo, due to start radiation on tomorrow.)    HPI 49 yo female with breast cancer s/p mastectomy without complaints   Review of Systems    Objective:      Physical Exam   Constitutional: She is oriented to person, place, and time. She appears well-developed and well-nourished.   HENT:   Head: Normocephalic and atraumatic.   Right Ear: External ear normal.   Left Ear: External ear normal.   Nose: Nose normal.   Mouth/Throat: Oropharynx is clear and moist.   Eyes: Conjunctivae and EOM are normal. Pupils are equal, round, and reactive to light.   Neck: Normal range of motion. Neck supple.   Cardiovascular: Normal rate, regular rhythm, normal heart sounds and intact distal pulses.   Pulmonary/Chest: Effort normal and breath sounds normal.   Abdominal: Soft. Bowel sounds are normal.   Musculoskeletal: Normal range of motion.   Neurological: She is alert and oriented to person, place, and time. She has normal reflexes.   Skin: Skin is warm and dry.   Psychiatric: She has a normal mood and affect. Her behavior is normal. Thought content normal.   Vitals reviewed.      Assessment:       1. Malignant neoplasm of upper-outer quadrant of left female breast, unspecified estrogen receptor status      doing well  Plan:       I will see her back in 3 months

## 2019-03-18 ENCOUNTER — NURSE TRIAGE (OUTPATIENT)
Dept: ADMINISTRATIVE | Facility: CLINIC | Age: 49
End: 2019-03-18

## 2019-03-18 ENCOUNTER — TELEPHONE (OUTPATIENT)
Dept: SURGERY | Facility: CLINIC | Age: 49
End: 2019-03-18

## 2019-03-18 NOTE — TELEPHONE ENCOUNTER
Reason for Disposition   Nursing judgment    Protocols used: ST NO PROTOCOL CALL: SICK ADULT-A-[place]    Ms. Bailey states she had 30 minutes of shortness of breath yesterday. She states she recently had a port placed. Patient denies the symptom at this time. She is requesting advice from Dr. Fisher.

## 2019-03-18 NOTE — TELEPHONE ENCOUNTER
Pt returned my call she said she had a period of shortness of breath for 30minutes yesterday and shes been fine ever since.   notified.  Pt advised it could have been anxiety but if it happens again she needs to get checked out.  Pt verbalized understanding.

## 2020-06-19 ENCOUNTER — TELEPHONE (OUTPATIENT)
Dept: TRANSPLANT | Facility: CLINIC | Age: 50
End: 2020-06-19

## 2020-06-19 NOTE — TELEPHONE ENCOUNTER
----- Message from Servando Leslie sent at 6/19/2020  5:11 PM CDT -----    Hepatology referral received and scanned into media; pt chart sent to referral nurse for medical review.      Recs also in Care Everywhere: [facility]

## 2020-06-24 ENCOUNTER — DOCUMENTATION ONLY (OUTPATIENT)
Dept: TRANSPLANT | Facility: CLINIC | Age: 50
End: 2020-06-24

## 2020-06-24 NOTE — PROGRESS NOTES
Pt records reviewed.  Pt will be referred to Hepatology due to fatty liver   Initial referral received  from Dr. Monica Ramírez  Referral letter sent to provider and patient.      RECORDS SCANNED IN MEDIA UNDER HEPATOLOGY REFERRAL .

## 2020-06-29 ENCOUNTER — TELEPHONE (OUTPATIENT)
Dept: HEPATOLOGY | Facility: CLINIC | Age: 50
End: 2020-06-29

## 2020-06-29 NOTE — TELEPHONE ENCOUNTER
----- Message from Dottie Bowling sent at 6/24/2020  4:22 PM CDT -----    ----- Message -----  From: Laurence Cantu  Sent: 6/24/2020   1:42 PM CDT  To: Dottie Sinclair,    Can I have the patient name please that needs to be scheduled with this  referral    Laurence Ahn  ----- Message -----  From: Dottie Bowling  Sent: 6/24/2020  11:19 AM CDT  To: Hepatology Scheduling    Pt records reviewed.  Pt will be referred to Hepatology due to fatty liver   Initial referral received  from Dr. Monica Ramírez  Referral letter sent to provider and patient.      RECORDS SCANNED IN MEDIA UNDER HEPATOLOGY REFERRAL .

## 2020-07-01 ENCOUNTER — TELEPHONE (OUTPATIENT)
Dept: HEPATOLOGY | Facility: CLINIC | Age: 50
End: 2020-07-01

## 2020-07-02 ENCOUNTER — TELEPHONE (OUTPATIENT)
Dept: HEPATOLOGY | Facility: CLINIC | Age: 50
End: 2020-07-02

## 2020-07-20 ENCOUNTER — TELEPHONE (OUTPATIENT)
Dept: HEPATOLOGY | Facility: CLINIC | Age: 50
End: 2020-07-20

## 2020-08-04 ENCOUNTER — PROCEDURE VISIT (OUTPATIENT)
Dept: HEPATOLOGY | Facility: CLINIC | Age: 50
End: 2020-08-04
Payer: MEDICAID

## 2020-08-04 ENCOUNTER — OFFICE VISIT (OUTPATIENT)
Dept: HEPATOLOGY | Facility: CLINIC | Age: 50
End: 2020-08-04
Payer: MEDICAID

## 2020-08-04 ENCOUNTER — LAB VISIT (OUTPATIENT)
Dept: LAB | Facility: HOSPITAL | Age: 50
End: 2020-08-04
Payer: MEDICAID

## 2020-08-04 VITALS
HEART RATE: 82 BPM | WEIGHT: 250.69 LBS | SYSTOLIC BLOOD PRESSURE: 142 MMHG | BODY MASS INDEX: 46.13 KG/M2 | OXYGEN SATURATION: 97 % | HEIGHT: 62 IN | DIASTOLIC BLOOD PRESSURE: 98 MMHG

## 2020-08-04 DIAGNOSIS — R74.8 ELEVATED LIVER ENZYMES: ICD-10-CM

## 2020-08-04 DIAGNOSIS — R74.8 ELEVATED LIVER ENZYMES: Primary | ICD-10-CM

## 2020-08-04 DIAGNOSIS — K76.9 LIVER LESION, RIGHT LOBE: ICD-10-CM

## 2020-08-04 DIAGNOSIS — E66.01 CLASS 3 SEVERE OBESITY WITH BODY MASS INDEX (BMI) OF 40.0 TO 44.9 IN ADULT, UNSPECIFIED OBESITY TYPE, UNSPECIFIED WHETHER SERIOUS COMORBIDITY PRESENT: ICD-10-CM

## 2020-08-04 DIAGNOSIS — K76.0 FATTY LIVER: ICD-10-CM

## 2020-08-04 LAB
A1AT SERPL-MCNC: 174 MG/DL (ref 100–190)
AFP SERPL-MCNC: 6 NG/ML (ref 0–8.4)
ALBUMIN SERPL BCP-MCNC: 3.7 G/DL (ref 3.5–5.2)
ALP SERPL-CCNC: 119 U/L (ref 55–135)
ALT SERPL W/O P-5'-P-CCNC: 199 U/L (ref 10–44)
AST SERPL-CCNC: 170 U/L (ref 10–40)
BASOPHILS # BLD AUTO: 0.02 K/UL (ref 0–0.2)
BASOPHILS NFR BLD: 0.3 % (ref 0–1.9)
BILIRUB DIRECT SERPL-MCNC: 0.3 MG/DL (ref 0.1–0.3)
BILIRUB SERPL-MCNC: 0.5 MG/DL (ref 0.1–1)
CERULOPLASMIN SERPL-MCNC: 30 MG/DL (ref 15–45)
CK SERPL-CCNC: 76 U/L (ref 20–180)
DIFFERENTIAL METHOD: NORMAL
EOSINOPHIL # BLD AUTO: 0.1 K/UL (ref 0–0.5)
EOSINOPHIL NFR BLD: 1.4 % (ref 0–8)
ERYTHROCYTE [DISTWIDTH] IN BLOOD BY AUTOMATED COUNT: 12.3 % (ref 11.5–14.5)
FERRITIN SERPL-MCNC: 554 NG/ML (ref 20–300)
HCT VFR BLD AUTO: 47.4 % (ref 37–48.5)
HGB BLD-MCNC: 15.8 G/DL (ref 12–16)
IMM GRANULOCYTES # BLD AUTO: 0.03 K/UL (ref 0–0.04)
IMM GRANULOCYTES NFR BLD AUTO: 0.4 % (ref 0–0.5)
IRON SERPL-MCNC: 101 UG/DL (ref 30–160)
LYMPHOCYTES # BLD AUTO: 1.9 K/UL (ref 1–4.8)
LYMPHOCYTES NFR BLD: 25.1 % (ref 18–48)
MCH RBC QN AUTO: 30 PG (ref 27–31)
MCHC RBC AUTO-ENTMCNC: 33.3 G/DL (ref 32–36)
MCV RBC AUTO: 90 FL (ref 82–98)
MONOCYTES # BLD AUTO: 0.6 K/UL (ref 0.3–1)
MONOCYTES NFR BLD: 8.4 % (ref 4–15)
NEUTROPHILS # BLD AUTO: 4.7 K/UL (ref 1.8–7.7)
NEUTROPHILS NFR BLD: 64.4 % (ref 38–73)
NRBC BLD-RTO: 0 /100 WBC
PLATELET # BLD AUTO: 210 K/UL (ref 150–350)
PMV BLD AUTO: 10.9 FL (ref 9.2–12.9)
PROT SERPL-MCNC: 8.2 G/DL (ref 6–8.4)
RBC # BLD AUTO: 5.26 M/UL (ref 4–5.4)
SATURATED IRON: 27 % (ref 20–50)
TOTAL IRON BINDING CAPACITY: 370 UG/DL (ref 250–450)
TRANSFERRIN SERPL-MCNC: 250 MG/DL (ref 200–375)
WBC # BLD AUTO: 7.36 K/UL (ref 3.9–12.7)

## 2020-08-04 PROCEDURE — 80076 HEPATIC FUNCTION PANEL: CPT

## 2020-08-04 PROCEDURE — 99214 OFFICE O/P EST MOD 30 MIN: CPT | Mod: PBBFAC | Performed by: PHYSICIAN ASSISTANT

## 2020-08-04 PROCEDURE — 82105 ALPHA-FETOPROTEIN SERUM: CPT

## 2020-08-04 PROCEDURE — 87340 HEPATITIS B SURFACE AG IA: CPT

## 2020-08-04 PROCEDURE — 99205 OFFICE O/P NEW HI 60 MIN: CPT | Mod: S$PBB,,, | Performed by: PHYSICIAN ASSISTANT

## 2020-08-04 PROCEDURE — 86706 HEP B SURFACE ANTIBODY: CPT

## 2020-08-04 PROCEDURE — 91200 LIVER ELASTOGRAPHY: CPT | Mod: PBBFAC | Performed by: PHYSICIAN ASSISTANT

## 2020-08-04 PROCEDURE — 82390 ASSAY OF CERULOPLASMIN: CPT

## 2020-08-04 PROCEDURE — 99999 PR PBB SHADOW E&M-EST. PATIENT-LVL IV: CPT | Mod: PBBFAC,,, | Performed by: PHYSICIAN ASSISTANT

## 2020-08-04 PROCEDURE — 86256 FLUORESCENT ANTIBODY TITER: CPT | Mod: 91

## 2020-08-04 PROCEDURE — 85025 COMPLETE CBC W/AUTO DIFF WBC: CPT

## 2020-08-04 PROCEDURE — 99205 PR OFFICE/OUTPT VISIT, NEW, LEVL V, 60-74 MIN: ICD-10-PCS | Mod: S$PBB,,, | Performed by: PHYSICIAN ASSISTANT

## 2020-08-04 PROCEDURE — 82728 ASSAY OF FERRITIN: CPT

## 2020-08-04 PROCEDURE — 99999 PR PBB SHADOW E&M-EST. PATIENT-LVL IV: ICD-10-PCS | Mod: PBBFAC,,, | Performed by: PHYSICIAN ASSISTANT

## 2020-08-04 PROCEDURE — 91200 LIVER ELASTOGRAPHY: CPT | Mod: 26,S$PBB,, | Performed by: PHYSICIAN ASSISTANT

## 2020-08-04 PROCEDURE — 82103 ALPHA-1-ANTITRYPSIN TOTAL: CPT

## 2020-08-04 PROCEDURE — 86803 HEPATITIS C AB TEST: CPT

## 2020-08-04 PROCEDURE — 86038 ANTINUCLEAR ANTIBODIES: CPT

## 2020-08-04 PROCEDURE — 91200 FIBROSCAN (VIBRATION CONTROLLED TRANSIENT ELASTOGRAPHY): ICD-10-PCS | Mod: 26,S$PBB,, | Performed by: PHYSICIAN ASSISTANT

## 2020-08-04 PROCEDURE — 83540 ASSAY OF IRON: CPT

## 2020-08-04 PROCEDURE — 82550 ASSAY OF CK (CPK): CPT

## 2020-08-04 PROCEDURE — 86235 NUCLEAR ANTIGEN ANTIBODY: CPT

## 2020-08-04 PROCEDURE — 36415 COLL VENOUS BLD VENIPUNCTURE: CPT

## 2020-08-04 PROCEDURE — 86790 VIRUS ANTIBODY NOS: CPT

## 2020-08-04 RX ORDER — AMLODIPINE BESYLATE 10 MG/1
TABLET ORAL
COMMUNITY
Start: 2020-07-21 | End: 2020-10-01

## 2020-08-04 RX ORDER — TAMOXIFEN CITRATE 10 MG/1
TABLET ORAL
COMMUNITY
Start: 2020-07-28 | End: 2020-10-01

## 2020-08-04 NOTE — PATIENT INSTRUCTIONS
1. Fibroscan today to look for fat or scar tissue in the liver  2. Follow up with me in 2 weeks   3. Will check immunity markers for Hepatitis A and B and arrange for vaccination if needed  4. Labs today to rule out other causes of liver disease. I will send you the results of your labs when they are all resulted, which is typically 1 week after your visit  5.  Follow up pending results of above    There is no FDA approved therapy for non-alcoholic fatty liver disease. Therefore, these things are important:  1. Weight loss goal of 25 lbs  2. Low carb/sugar, high fiber and protein diet.Try to limit your carb intake to LESS than 30-45 grams of carbs with a meal or LESS than 5-10 grams with any snack (total of any snack foods eaten during that time). Use MyFitness Pal donald to add up your carbs through the day. Do NOT drink any beverages with calories or carbs (this can lead to high blood sugar and weight gain). Also, some of our patients have been very successful with weight loss using the pre made/planned meal planning services that limit calories and portion size (one example is Sensible Meals but there are many out there)  3. Exercise, 5 days per week, 30 minutes per day, as tolerated  4. Recommend good cholesterol, blood pressure, blood sugar levels     *If statins are being considered for HLD, statins are safe in patients with liver disease. Statins can be used to treat dyslipidemia in patients with both NAFLD and CH[place]AGNE.    In some people, fatty liver can progress to steatohepatitis (inflamatory fatty liver) and possibly to cirrhosis, putting one at increased risk for liver cancer, liver failure, and death. Therefore, the lifestyle changes are very important to decrease this risk.     Website with information about fatty liver and inflammation related to fatty liver (CH[place]) = www.nashtruth.com  AND www.NASHactually.com    Tips for low/moderate carbohydrate diet:  3 meals a day made up of the following:  -- Unlimited  green vegetables, tomatoes, mushrooms, spaghetti squash, cauliflower, meat, poultry, seafood, eggs and hard cheeses.   -- Milk and plain yogurt  -- Dressings, seasonings, condiments, etc should have less than 2 g sugars.   -- Beans (1-1.5 cups) or nuts (1/4 cup): can have 1 x a day.   -- 1-2 servings of citrus fruits, berries, pineapple or melon a day (1/2 cup)  -- Avoid fried foods    *No grains, rice, pasta, potatoes, bread, corn, peas, oatmeal, grits, tortillas, crackers, chips    *No soda, sweet tea, juices or lemonade    Try www.dietdoctor.Mobile [employer] Case for recipes (moderate carb intake)

## 2020-08-04 NOTE — Clinical Note
Please call patient and schedule follow up with me in the next 2 weeks. Also, give her the number to the nutrition referral: call the [facility] in [place]: [phone] and the  will transfer the call to one of the dieticians. Thanks!

## 2020-08-04 NOTE — PROCEDURES
FibroScan (Vibration Controlled Transient Elastography)    Date/Time: 8/4/2020 2:15 PM  Performed by: Jayda Meléndez PA-C  Authorized by: Jayda Meléndez PA-C     Diagnosis:  NAFLD    Probe:  XL    Universal Protocol: Patient's identity, procedure and site were verified, confirmatory pause was performed.  Discussed procedure including risks and potential complications.  Questions answered.  Patient verbalizes understanding and wishes to proceed with VCTE.     Procedure: After providing explanations of the procedure, patient was placed in the supine position with right arm in maximum abduction to allow optimal exposure of right lateral abdomen.  Patient was briefly assessed, Testing was performed in the mid-axillary location, 50Hz Shear Wave pulses were applied and the resulting Shear Wave and Propagation Speed detected with a 3.5 MHz ultrasonic signal, using the FibroScan probe, Skin to liver capsule distance and liver parenchyma were accessed during the entire examination with the FibroScan probe, Patient was instructed to breathe normally and to abstain from sudden movements during the procedure, allowing for random measurements of liver stiffness. At least 10 Shear Waves were produced, Individual measurements of each Shear Wave were calculated.  Patient tolerated the procedure well with no complications.  Meets discharge criteria as was dismissed.  Rates pain 0 out of 10.  Patient will follow up with ordering provider to review results.      Findings  Median liver stiffness score:  20.2  CAP Reading: dB/m:  369    IQR/med %:  15  Interpretation  Fibrosis interpretation is based on medial liver stiffness - Kilopascal (kPa).    Fibrosis Stage:  F4  Steatosis interpretation is based on controlled attenuation parameter - (dB/m).    Steatosis Grade:  S3

## 2020-08-04 NOTE — PROGRESS NOTES
[facility] - New Patient     REFERRING PROVIDER: Dr. Monica Ramírez    CHIEF COMPLAINT: Fatty liver, elevated liver enzymes on tamoxifen therapy, abnormal MRI imaging     HPI: This is a 50 y.o. White female referred for evaluation of fatty liver and elevated liver enzymes in the context of chronic tamoxifen therapy (since 10/2019). In addition, the patient had a likely hemangioma on MRI last year without repeat cross-sectional imaging.    I reviewed her outside records:   · I note her history of L breast cancer (invasive ductal left breast carcinoma dx 7/2018) s/p 4 cycles of neoadjuvant chemotherapy, left mastectomy + SLNB; as well as completed radiation treament 4/2019. She began tamoxifen 10/2019, prescribed daily. Per chart review the patient has had side effects of hot flashes, mood swings, and weight gain and so takes tamoxifen intermittently. Declined lupron therapy.  · CTA angiogram prior to flap revision (7/3/19) showed an incidental 5mm flash filling defect in right lobe of the liver in 7/2019; follow up was recommended 3-6 months from then but from what I gather, not completed.  · Elevated liver enzymes since at least 2018 with ultrasound in 6/2019 suggesting fatty liver (prior to tamoxifen use), no evidence of cirrhosis or masses  · Most recent outside ultrasound 5/28/20 suggests normal size liver although not measured in interpretation; the common duct measures 6.1 mm, within normal limits       Risk factors of fatty liver include severe morbid obesity, HTN; tamoxifen use      No previous serologic workup     Her enzymes have been elevated since at least 2018.   Synthetic liver function wnl  Platelets wnl although <200  Last labs from outside records:  6/15/2020:   Alk phos 119 Tbili 0.6 Albumin 3.4  Platelet 196 H/H 15.3/46.9    Prior:  Lab Results   Component Value Date    ALT 85 (H) 01/30/2019    AST 56 (H) 01/30/2019    ALKPHOS 76 01/30/2019    BILITOT 0.2  "2019    ALBUMIN 3.9 2019    INR 1.0 2010     2019     Hepatitis immunization status: unknown    Interval history:   She is here today alone. The patient feels well, denies symptoms of hepatic decompensation including jaundice, ascites, cognitive problems to suggest hepatic encephalopathy, or GI bleeding. No pruritus or new rashes. ROS as below.     Intermittently takes her tamoxifen as has side effects from it. Did not take her blood pressure pills (norvasc) today. Started this medication last week as losartan was giving her headaches and a cough.    The patient is the "heaviest she has ever been." Specifically she has gained 30 lbs since chemo started [id number]. Stopped drinking alcohol 10 years ago     She was supposed to get an MRI for f/u of the liver mass, but they could not get an IV and the patient had a panic attack and declined obtaining the MRI.     Concerning medications: no new medications; intermittent use of tamoxifen  Supplements/herbal: denies   Diet/Exercise: recently increasing walking and eating vegetables/salads           Review of patient's allergies indicates:  No Known Allergies     Current Outpatient Medications on File Prior to Visit   Medication Sig Dispense Refill    losartan (COZAAR) 50 MG tablet Take 50 mg by mouth once daily.      oxyCODONE-acetaminophen (PERCOCET) 5-325 mg per tablet Take 1 tablet by mouth every 4 (four) hours as needed for Pain. 30 tablet 0     No current facility-administered medications on file prior to visit.          PMHX:  has a past medical history of Anemia, Cancer, Encounter for blood transfusion, Heart murmur, Hypertension, and Interstitial cystitis.    PSHX:  has a past surgical history that includes  section; Hernia repair; Hysterectomy; Breast surgery; Insertion of tunneled central venous catheter (CVC) with subcutaneous port (N/A, 2018); Simple mastectomy (Left, 2019); [place] lymph node biopsy (Left, " 2/6/2019); Injection for sentinel node identification (Left, 2/6/2019); and Insertion of breast tissue expander (Left, 2/6/2019).    FAMILY HISTORY:   Family History   Problem Relation Age of Onset    Heart failure Mother     Breast cancer Neg Hx     Colon cancer Neg Hx     Ovarian cancer Neg Hx        SOCIAL HISTORY:   Social History     Tobacco Use   Smoking Status Never Smoker   Smokeless Tobacco Never Used       Social History     Substance and Sexual Activity   Alcohol Use No       Social History     Substance and Sexual Activity   Drug Use No       Review of Systems   Constitutional: Negative for appetite change, chills, fatigue, fever.   Eyes: Negative for visual disturbance.   Respiratory: Negative for cough and shortness of breath.    Cardiovascular: Negative for chest pain, palpitations and leg swelling.   Gastrointestinal: Negative for abdominal distention, abdominal pain, blood in stool, constipation, diarrhea, nausea and vomiting.  Genitourinary: Negative for dysuria and hematuria. Denies dark urine.   Musculoskeletal: Negative for arthralgias, gait problem, joint swelling.   Skin: Negative for color change, rash and wound. Denies itching.   Neurological: Negative for dizziness, tremors, speech difficulty, and headaches.   Hematological: Does not bruise/bleed easily.   Psychiatric/Behavioral: Negative for confusion, decreased concentration and sleep disturbance. Denies memory loss. Denies depression.       DATA     Physical Exam   Constitutional: Friendly adult female, appears stated age. Obese. No distress. Alert and oriented to person, place, and time.  Eyes: No scleral icterus.   Cardiovascular: Normal rate, regular rhythm and normal heart sounds. No murmur heard.  Pulmonary/Chest: Respiratory effort and breath sounds normal. No respiratory distress.   Abdominal: Obese, soft, non-tender. No distension; no ascites appreciated. There is no palpable hepatosplenomegaly. No hernia or mass.  "  Musculoskeletal: No edema.   Neurological: No tremor. Coordination and gait normal.  Skin: Skin is warm and dry. No rash or erythema. No jaundice. No telangiectasias or palmar erythema noted.  Psychiatric: Normal mood and affect. Speech, behavior, and thought content normal. No depression or anxiety noted.     BP (!) 167/101 (BP Location: Right arm, Patient Position: Sitting, BP Method: Medium (Automatic))   Pulse 82   Ht 5' 2" (1.575 m)   Wt 113.7 kg (250 lb 10.6 oz)   SpO2 97%   BMI 45.85 kg/m²    Manual recheck: 142/98    LABS:  Lab Results   Component Value Date    WBC 5.99 01/30/2019    HGB 15.1 01/30/2019    HCT 45.6 01/30/2019     01/30/2019     01/30/2019    K 4.6 01/30/2019    CREATININE 0.8 01/30/2019    ALT 85 (H) 01/30/2019    AST 56 (H) 01/30/2019    ALKPHOS 76 01/30/2019    BILITOT 0.2 01/30/2019    ALBUMIN 3.9 01/30/2019    INR 1.0 12/08/2010        DIAGNOSTIC STUDIES:  Abd. Ultrasound -  Date: 5/28/2020  Result Impression   1.   Hepatic steatosis. There is no sonographic evidence of cirrhosis.  2.   There is no sonographic evidence of cholelithiasis or acute cholecystitis. There is no biliary ductal dilatation.    Electronically Signed By: Ryan Woodard 5/28/2020 9:58 AM CDT   Result Narrative   Technique: Multiple sonographic images of the right upper quadrant were obtained with a curved multi-hertz transducer.    Comparison: CTA of the pelvis July 03, 2019, right upper quadrant ultrasound June 13, 2019    Clinical:   Fatty liver    Findings:  The liver is normal in size with a diffuse increase in the parenchymal echotexture and attenuation of the sound beam compatible with steatosis.  There is no surface nodularity.  No cystic or solid masses are seen.    There is no intrahepatic or extrahepatic ductal dilatation. The common duct measures 6.1 mm, within normal limits      The gallbladder is well distended without stones or sludge. There is no pericholecystic fluid or wall " thickening.    The visualized portion of the pancreas is normal.    The right kidney is normal in size and echotexture without evidence of a solid mass or hydronephrosis. The right kidney measures 11.2 cm.   Other Result Information   Pritesh, Rad Results In - 05/28/2020 10:02 AM CDT  Technique: Multiple sonographic images of the right upper quadrant were obtained with a curved multi-hertz transducer.    Comparison: CTA of the pelvis July 03, 2019, right upper quadrant ultrasound June 13, 2019    Clinical:   Fatty liver    Findings:  The liver is normal in size with a diffuse increase in the parenchymal echotexture and attenuation of the sound beam compatible with steatosis.  There is no surface nodularity.  No cystic or solid masses are seen.    There is no intrahepatic or extrahepatic ductal dilatation. The common duct measures 6.1 mm, within normal limits      The gallbladder is well distended without stones or sludge. There is no pericholecystic fluid or wall thickening.    The visualized portion of the pancreas is normal.    The right kidney is normal in size and echotexture without evidence of a solid mass or hydronephrosis. The right kidney measures 11.2 cm.    IMPRESSION:   1.   Hepatic steatosis. There is no sonographic evidence of cirrhosis.  2.   There is no sonographic evidence of cholelithiasis or acute cholecystitis. There is no biliary ductal dilatation.    Electronically Signed By: Ryan Woodard 5/28/2020 9:58 AM CDT             MRI - 7/3/2019  [facility]  Result Impression   1.   Grossly unremarkable arteriogram. No aneurysmal dilation or significant stenosis of the aorta and branch vessels identified.  2.   Interval left mastectomy and breast implant in this patient with reported history of malignant breast neoplasm. There is skin thickening of the left breast. This could reflect post radiation change although infiltrative neoplasm cannot be excluded by CT. Please correlate with dedicated mammographic  studies.  3.   Interval ventral abdominal hernia repair with recurrent/residual diastases and ventral abdominal wall scarring as discussed above.  4.   Incidental 5 mm flash filling lesion in the right lobe of the liver, not present on prior exam. While this may represent a flash filling hemangioma, given the patient's history of breast neoplasm and underlying hepatic steatosis, recommend follow-up liver protocol preferably with MRI in 3-6 months to exclude a developing lesion.  5.   Tiny hypodensities in the pancreas head/uncinate process region, of doubtful clinical significance and likely were present in 2017 but not as well seen due to contrast phase. Recommend a one-year follow-up pancreas protocol for surveillance. There is no vascular encasement, dominant mass, or peripancreatic inflammatory change.  6.   Nonobstructing tiny bilateral calyceal stones.  7.   Left upper pole renal cyst and other tiny cortical hypodensities which also probably represent benign cysts but are too small to definitively characterize.  8.   Subtotal hysterectomy.  9.   Other chronic and incidental findings as discussed above.    Electronically Signed By: Sagar Merida 7/3/2019 3:39 PM CDT        EDUCATION / DISCUSSION:    There is no FDA approved therapy for non-alcoholic fatty liver disease. Therefore, these things are important:  1. Weight loss goal of 25 lbs  2. Low carb/sugar, high fiber and protein diet.Try to limit your carb intake to LESS than 30-45 grams of carbs with a meal or LESS than 5-10 grams with any snack (total of any snack foods eaten during that time). Use Bloomz Pal donald to add up your carbs through the day. Do NOT drink any beverages with calories or carbs (this can lead to high blood sugar and weight gain). Also, some of our patients have been very successful with weight loss using the pre made/planned meal planning services that limit calories and portion size (one example is Sensible Meals but there are many  out there)  3. Exercise, 5 days per week, 30 minutes per day, as tolerated  4. Recommend good cholesterol, blood pressure, blood sugar levels     *If statins are being considered for HLD, statins are safe in patients with liver disease. Statins can be used to treat dyslipidemia in patients with both NAFLD and CH[place]AGNE.    In some people, fatty liver can progress to steatohepatitis (inflamatory fatty liver) and possibly to cirrhosis, putting one at increased risk for liver cancer, liver failure, and death. Therefore, the lifestyle changes are very important to decrease this risk.     Website with information about fatty liver and inflammation related to fatty liver (CHAMPAGNE) = [email or web address].[email or web address] Sounds  AND www.NASHactually.com    Tips for low/moderate carbohydrate diet:  3 meals a day made up of the following:  -- Unlimited green vegetables, tomatoes, mushrooms, spaghetti squash, cauliflower, meat, poultry, seafood, eggs and hard cheeses.   -- Milk and plain yogurt  -- Dressings, seasonings, condiments, etc should have less than 2 g sugars.   -- Beans (1-1.5 cups) or nuts (1/4 cup): can have 1 x a day.   -- 1-2 servings of citrus fruits, berries, pineapple or melon a day (1/2 cup)  -- Avoid fried foods    *No grains, rice, pasta, potatoes, bread, corn, peas, oatmeal, grits, tortillas, crackers, chips    *No soda, sweet tea, juices or lemonade    Try [email or web address] for recipes (moderate carb intake)      ASSESSMENT & PLAN     50 y.o. White female with:    1. Fatty liver, likely related to metabolic risk factors  -- Outside U/S shows suggests normal size liver although not measured in interpretation; the common duct measures 6.1 mm, within normal limits   -- Transaminases ALT>AST  -- Synthetic liver function WNL  -- Risk factors for fatty liver disease include obesity, HTN  -- Immunity to Hep A and B : Will check immunity markers for HBV/HAV and arrange for vaccination if needed  -- Fibroscan today  -- Recommendations discussed  with patient as above  -- no current signs or symptoms of portal htn or hepatic decompensation     FIB-4 score = 2    2. Elevated liver enzymes  -- will complete full sero w/u   -- labs today    3. Obesity, HTN   -- Body mass index is 45.85 kg/m².   -- increases risk for fatty liver  -- offered referral to nutritionist  -- discussed continue walking and watching what you eat     4. Elevated blood pressure   -- manual recheck:  142/98  -- patient did not take medications today. Instructed to take meds Q AM  -- alarm sx reviewed with patient     5. Abnormal MRI  -- likely will need repeat MRI; will await fibroscan results, as may add MRI elastrography     6. Long-term tamoxifen use since 10/2019   -- d/w Dr. [place], do not recommend discontinuing tamoxifen if beneficial for cancer risk  -- will repeat liver enzymes today  -- enzyme increase likely two-fold w/ increasing weight and tamoxifen use  -- LiverTox:   Tamoxifen has been associated with rare instances of idiosyncratic, clinically apparent liver injury, typically arising within the first six months of treatment and having variable presentations with cholestatic, mixed or hepatocellular pattern of enzyme elevations. Immunoallergic features (fever, rash, eosinophilia) are uncommon, as are autoantibodies. Some instances have been severe with signs of hepatic failure, but most cases are self-limited. Long term tamoxifen therapy has also been linked to isolated cases of peliosis hepatis, hepatic cysts and several cases of hepatocellular carcinoma in women with no other risk factors for this tumor. However, in large retrospective analyses, no increase in hepatocellular carcinoma in women taking tamoxifen for 5 years has been demonstrated, although these same studies did show an increase in rates of endometrial carcinoma. Tamoxifen therapy has also been linked to the development of fatty liver and steatohepatitis.            Orders Placed This Encounter   Procedures     FibroScan (Vibration Controlled Transient Elastography)    AFP tumor marker    Hepatic function panel    Hepatitis A antibody, IgG    Hepatitis B Surface Ab, Qualitative    Hepatitis C Antibody    Hepatitis B Surface Antigen    Alpha-1-Antitrypsin    LADARIUS Screen w/Reflex    Antimitochondrial Antibody    Anti-Smooth Muscle Antibody    Ceruloplasmin    CK    Ferritin    Iron and TIBC    Ambulatory referral/consult to Nutrition Services         Referral to nutrition services  Fibroscan and labs today   Return to clinic in two weeks to discuss results  Will likely need repeat MRI with elastography based on BMI          Total duration of visit = 60 min, with > 50% spent counseling    Thank you for allowing me to participate in the care of Augustina Franciserma Meléndez PA-C  Hepatology

## 2020-08-04 NOTE — LETTER
August 4, 2020      Monica Ramírez MD  [address]  [place] [address]           [facility] - Hepatology  [address]  [facility] [address]  Phone: [phone]  Fax: [phone]          Patient: Augustina Bailey   MR Number: [id number]   YOB: 1970   Date of Visit: 8/4/2020       Dear Dr. Monica Ramírez:    Thank you for referring Augustina Bailey to me for evaluation. Attached you will find relevant portions of my assessment and plan of care.    If you have questions, please do not hesitate to call me. I look forward to following Auugstina Bailey along with you.    Sincerely,    Jayda Meléndez PA-C    Enclosure  CC:  No Recipients    If you would like to receive this communication electronically, please contact [email or web address] or [phone] to request more information on [facility] access.    For providers and/or their staff who would like to refer a patient to [facility], please contact us through our one-stop-shop provider referral line, [facility], at [phone].    If you feel you have received this communication in error or would no longer like to receive these types of communications, please e-mail [email or web address]

## 2020-08-05 LAB
ANA SER QL IF: NORMAL
HBV SURFACE AB SER-ACNC: NEGATIVE M[IU]/ML
HBV SURFACE AG SERPL QL IA: NEGATIVE
HCV AB SERPL QL IA: NEGATIVE
HEPATITIS A ANTIBODY, IGG: NEGATIVE

## 2020-08-06 LAB
MITOCHONDRIA AB TITR SER IF: NORMAL {TITER}
SMOOTH MUSCLE AB TITR SER IF: NORMAL {TITER}

## 2020-09-10 ENCOUNTER — TELEPHONE (OUTPATIENT)
Dept: HEPATOLOGY | Facility: CLINIC | Age: 50
End: 2020-09-10

## 2020-09-10 NOTE — TELEPHONE ENCOUNTER
----- Message from Jayda Meléndez PA-C sent at 9/10/2020  2:51 PM CDT -----  Contact: pt  Yes  ----- Message -----  From: Rhonda Bryan MA  Sent: 9/10/2020   2:38 PM CDT  To: Jayda Meléndez PA-C    Does this patient need a  follow up  ----- Message -----  From: Lizeth Lucero  Sent: 9/10/2020   2:29 PM CDT  To: [facility]el[facility] Staff    Patient calling to  appt       [phone]   Call Back :

## 2020-09-15 ENCOUNTER — TELEPHONE (OUTPATIENT)
Dept: HEPATOLOGY | Facility: CLINIC | Age: 50
End: 2020-09-15

## 2020-09-15 NOTE — TELEPHONE ENCOUNTER
----- Message from Jayda Meléndez PA-C sent at 9/15/2020 11:41 AM CDT -----  No show last Friday, needs to be rescheduled with me. Video visit is totally fine. Thanks

## 2020-10-01 ENCOUNTER — OFFICE VISIT (OUTPATIENT)
Dept: HEPATOLOGY | Facility: CLINIC | Age: 50
End: 2020-10-01
Payer: MEDICAID

## 2020-10-01 VITALS
HEART RATE: 74 BPM | BODY MASS INDEX: 46.13 KG/M2 | DIASTOLIC BLOOD PRESSURE: 96 MMHG | SYSTOLIC BLOOD PRESSURE: 135 MMHG | OXYGEN SATURATION: 96 % | WEIGHT: 250.69 LBS | HEIGHT: 62 IN

## 2020-10-01 DIAGNOSIS — K76.0 FATTY LIVER: ICD-10-CM

## 2020-10-01 DIAGNOSIS — E66.01 CLASS 3 SEVERE OBESITY WITH BODY MASS INDEX (BMI) OF 40.0 TO 44.9 IN ADULT, UNSPECIFIED OBESITY TYPE, UNSPECIFIED WHETHER SERIOUS COMORBIDITY PRESENT: Primary | ICD-10-CM

## 2020-10-01 DIAGNOSIS — K76.9 LIVER LESION, RIGHT LOBE: ICD-10-CM

## 2020-10-01 PROCEDURE — 99214 PR OFFICE/OUTPT VISIT, EST, LEVL IV, 30-39 MIN: ICD-10-PCS | Mod: S$PBB,,, | Performed by: PHYSICIAN ASSISTANT

## 2020-10-01 PROCEDURE — 99215 OFFICE O/P EST HI 40 MIN: CPT | Mod: PBBFAC | Performed by: PHYSICIAN ASSISTANT

## 2020-10-01 PROCEDURE — 99214 OFFICE O/P EST MOD 30 MIN: CPT | Mod: S$PBB,,, | Performed by: PHYSICIAN ASSISTANT

## 2020-10-01 PROCEDURE — 99999 PR PBB SHADOW E&M-EST. PATIENT-LVL V: CPT | Mod: PBBFAC,,, | Performed by: PHYSICIAN ASSISTANT

## 2020-10-01 PROCEDURE — 99999 PR PBB SHADOW E&M-EST. PATIENT-LVL V: ICD-10-PCS | Mod: PBBFAC,,, | Performed by: PHYSICIAN ASSISTANT

## 2020-10-01 NOTE — PROGRESS NOTES
[facility] - Est Patient     LAST CLINIC VISIT: 8/4/2020 with me    CHIEF COMPLAINT: Fatty liver, elevated liver enzymes on tamoxifen therapy, abnormal MRI imaging      HPI: Augustina Bailey is a 50 y.o. White female returning for evaluation of fatty liver and elevated liver enzymes in the context of chronic tamoxifen therapy (since 10/2019) after having breast cancer, although the patient reports minimal adherence to tamoxifen therapy. Notably, she has had significant weight gain of 30 lbs in the past year and intermittently takes her HTN medications.     Her enzymes have been elevated(ALT>AST) since at least 2018. Her synthetic function has remained normal. Since I last saw her, the patient's liver enzymes remain elevated. Her synthetic function is normal. I do not have any imaging from [facility].     Fibroscan: 8/4/2020: F4, S3 (likely limited by body habitus)     In addition, the patient had a likely hemangioma on MRI last year without repeat cross-sectional imaging.    I reviewed her outside records:   · I note her history of L breast cancer (invasive ductal left breast carcinoma dx 7/2018) s/p 4 cycles of neoadjuvant chemotherapy, left mastectomy + SLNB; as well as completed radiation treament 4/2019. She began tamoxifen 10/2019, prescribed daily. Per chart review the patient has had side effects of hot flashes, mood swings, and weight gain and so takes tamoxifen intermittently. Declined lupron therapy.  · CTA angiogram prior to flap revision (7/3/19) showed an incidental 5mm flash filling defect in right lobe of the liver in 7/2019; follow up was recommended 3-6 months from then but from what I gather, not completed.  · Elevated liver enzymes since at least 2018 with ultrasound in 6/2019 suggesting fatty liver (prior to tamoxifen use), no evidence of cirrhosis or masses  · Most recent outside ultrasound 5/28/20 suggests normal size liver although not measured in interpretation; the common duct  "measures 6.1 mm, within normal limits       Risk factors of fatty liver include severe morbid obesity, HTN; tamoxifen use     Labs:  ALT>AST  Platelets wnl although <200 at times     9/25/2020 5:03 PM @ [facility] [place]  Component Name Value Ref Range   Total Protein 8.6 (H) 6.4 - 8.2 g/dL   Albumin 3.6 3.4 - 5 g/dL    (H) 15 - 37 U/L    (H) 13 - 56 U/L   Alkaline Phosphatase 128 (H) 45 - 117 U/L   Bilirubin, Total 0.6    0.2 - 1 mg/dL   Bilirubin, Direct 0.2 0 - 0.2 mg/dL     Lab Results   Component Value Date     (H) 08/04/2020     (H) 08/04/2020    ALKPHOS 119 08/04/2020    BILITOT 0.5 08/04/2020    ALBUMIN 3.7 08/04/2020    INR 1.0 12/08/2010     08/04/2020     Initial referral: 6/15/2020:         Alk phos 119   Tbili 0.6   Albumin 3.4  Platelet 196 H/H 15.3/46.9    Prior serologic workup:   Lab Results   Component Value Date    SMOOTHMUSCAB Negative 1:40 08/04/2020    AMAIFA Negative 1:40 08/04/2020    ANASCREEN Negative <1:80 08/04/2020    FERRITIN 554 (H) 08/04/2020    FESATURATED 27 08/04/2020    CERULOPLSM 30.0 08/04/2020    HEPBSAG Negative 08/04/2020    HEPCAB Negative 08/04/2020     Hepatitis immunization status: not immunized     Interval history:   She is here today alone. The patient feels well, denies symptoms of hepatic decompensation including jaundice, ascites, cognitive problems to suggest hepatic encephalopathy, or GI bleeding. No pruritus or new rashes. ROS as below.     Stopped tamoxifen entirely. She reports she "barely" was taking it since it was prescribed October 2019. Last time she took it was two weeks ago. Saw her oncologist last week and they repeated labs there, she is no longer on tamoxifen. She reports she does not like to take medication.    Did not take her blood pressure pills (norvasc) today.     She was supposed to get an MRI for f/u of the liver mass, but they could not get an IV and the patient had a panic attack and declined " "obtaining the MRI.     Concerning medications: no new medications; intermittent use of tamoxifen  Supplements/herbal: denies   Diet/Exercise: has not made an effort at weight loss since our last visit. The patient is the "heaviest she has ever been." Specifically she has gained 30 lbs since chemo started 4070-0225.    ETOH: Stopped drinking alcohol 10 years ago        Review of patient's allergies indicates:  No Known Allergies     Current Outpatient Medications on File Prior to Visit   Medication Sig Dispense Refill    [DISCONTINUED] amLODIPine (NORVASC) 10 MG tablet       [DISCONTINUED] tamoxifen (NOLVADEX) 10 MG Tab        No current facility-administered medications on file prior to visit.          PMHX:  has a past medical history of Anemia, Cancer, Encounter for blood transfusion, Heart murmur, Hypertension, and Interstitial cystitis.    PSHX:  has a past surgical history that includes  section; Hernia repair; Hysterectomy; Breast surgery; Insertion of tunneled central venous catheter (CVC) with subcutaneous port (N/A, 2018); Simple mastectomy (Left, 2019); [place] lymph node biopsy (Left, 2019); Injection for sentinel node identification (Left, 2019); and Insertion of breast tissue expander (Left, 2019).    FAMILY HISTORY:   Family History   Problem Relation Age of Onset    Heart failure Mother     Breast cancer Neg Hx     Colon cancer Neg Hx     Ovarian cancer Neg Hx        SOCIAL HISTORY:   Social History     Tobacco Use   Smoking Status Never Smoker   Smokeless Tobacco Never Used       Social History     Substance and Sexual Activity   Alcohol Use No       Social History     Substance and Sexual Activity   Drug Use No       Review of Systems   Constitutional: Negative for appetite change, chills, fatigue, fever.   Eyes: Negative for visual disturbance.   Respiratory: Negative for cough and shortness of breath.    Cardiovascular: Negative for chest pain, palpitations and " "leg swelling.   Gastrointestinal: Negative for abdominal distention, abdominal pain, blood in stool, constipation, diarrhea, nausea and vomiting.  Genitourinary: Negative for dysuria and hematuria. Denies dark urine.   Musculoskeletal: Negative for arthralgias, gait problem, joint swelling.   Skin: Negative for color change, rash and wound. Denies itching.   Neurological: Negative for dizziness, tremors, speech difficulty, and headaches.   Hematological: Does not bruise/bleed easily.   Psychiatric/Behavioral: Negative for confusion, decreased concentration and sleep disturbance. Denies memory loss. Denies depression.       DATA     Physical Exam   Constitutional: Friendly adult female, appears stated age. Obese. No distress. Alert and oriented to person, place, and time.  Eyes: No scleral icterus.   Cardiovascular: Normal rate, regular rhythm and normal heart sounds. No murmur heard.  Pulmonary/Chest: Respiratory effort and breath sounds normal. No respiratory distress.   Abdominal: Obese, soft, non-tender. No distension; no ascites appreciated. There is no palpable hepatosplenomegaly. No hernia or mass.   Musculoskeletal: No edema.   Neurological: No tremor. Coordination and gait normal.  Skin: Skin is warm and dry. No rash or erythema. No jaundice. No telangiectasias or palmar erythema noted.  Psychiatric: Normal mood and affect. Speech, behavior, and thought content normal. No depression or anxiety noted.     BP (!) 135/96 (BP Location: Right arm, Patient Position: Sitting, BP Method: Medium (Automatic))   Pulse 74   Ht 5' 2" (1.575 m)   Wt 113.7 kg (250 lb 10.6 oz)   SpO2 96%   BMI 45.85 kg/m²        DIAGNOSTIC STUDIES:  Abd. Ultrasound -  Date: 5/28/2020  Result Impression   1.   Hepatic steatosis. There is no sonographic evidence of cirrhosis.  2.   There is no sonographic evidence of cholelithiasis or acute cholecystitis. There is no biliary ductal dilatation.    Electronically Signed By: Ryan Woodard " 5/28/2020 9:58 AM CDT   Result Narrative   Technique: Multiple sonographic images of the right upper quadrant were obtained with a curved multi-hertz transducer.    Comparison: CTA of the pelvis July 03, 2019, right upper quadrant ultrasound June 13, 2019    Clinical:   Fatty liver    Findings:  The liver is normal in size with a diffuse increase in the parenchymal echotexture and attenuation of the sound beam compatible with steatosis.  There is no surface nodularity.  No cystic or solid masses are seen.    There is no intrahepatic or extrahepatic ductal dilatation. The common duct measures 6.1 mm, within normal limits      The gallbladder is well distended without stones or sludge. There is no pericholecystic fluid or wall thickening.    The visualized portion of the pancreas is normal.    The right kidney is normal in size and echotexture without evidence of a solid mass or hydronephrosis. The right kidney measures 11.2 cm.   Other Result Information   Pritesh, Rad Results In - 05/28/2020 10:02 AM CDT  Technique: Multiple sonographic images of the right upper quadrant were obtained with a curved multi-hertz transducer.    Comparison: CTA of the pelvis July 03, 2019, right upper quadrant ultrasound June 13, 2019    Clinical:   Fatty liver    Findings:  The liver is normal in size with a diffuse increase in the parenchymal echotexture and attenuation of the sound beam compatible with steatosis.  There is no surface nodularity.  No cystic or solid masses are seen.    There is no intrahepatic or extrahepatic ductal dilatation. The common duct measures 6.1 mm, within normal limits      The gallbladder is well distended without stones or sludge. There is no pericholecystic fluid or wall thickening.    The visualized portion of the pancreas is normal.    The right kidney is normal in size and echotexture without evidence of a solid mass or hydronephrosis. The right kidney measures 11.2 cm.    IMPRESSION:   1.   Hepatic  steatosis. There is no sonographic evidence of cirrhosis.  2.   There is no sonographic evidence of cholelithiasis or acute cholecystitis. There is no biliary ductal dilatation.    Electronically Signed By: Ryan Woodard 5/28/2020 9:58 AM CDT             MRI - 7/3/2019  [facility]  Result Impression   1.   Grossly unremarkable arteriogram. No aneurysmal dilation or significant stenosis of the aorta and branch vessels identified.  2.   Interval left mastectomy and breast implant in this patient with reported history of malignant breast neoplasm. There is skin thickening of the left breast. This could reflect post radiation change although infiltrative neoplasm cannot be excluded by CT. Please correlate with dedicated mammographic studies.  3.   Interval ventral abdominal hernia repair with recurrent/residual diastases and ventral abdominal wall scarring as discussed above.  4.   Incidental 5 mm flash filling lesion in the right lobe of the liver, not present on prior exam. While this may represent a flash filling hemangioma, given the patient's history of breast neoplasm and underlying hepatic steatosis, recommend follow-up liver protocol preferably with MRI in 3-6 months to exclude a developing lesion.  5.   Tiny hypodensities in the pancreas head/uncinate process region, of doubtful clinical significance and likely were present in 2017 but not as well seen due to contrast phase. Recommend a one-year follow-up pancreas protocol for surveillance. There is no vascular encasement, dominant mass, or peripancreatic inflammatory change.  6.   Nonobstructing tiny bilateral calyceal stones.  7.   Left upper pole renal cyst and other tiny cortical hypodensities which also probably represent benign cysts but are too small to definitively characterize.  8.   Subtotal hysterectomy.  9.   Other chronic and incidental findings as discussed above.    Electronically Signed By: Sagar Merida 7/3/2019 3:39 PM CDT        EDUCATION /  DISCUSSION:    There is no FDA approved therapy for non-alcoholic fatty liver disease. Therefore, these things are important:  1. Weight loss goal of 25 lbs  2. Low carb/sugar, high fiber and protein diet.Try to limit your carb intake to LESS than 30-45 grams of carbs with a meal or LESS than 5-10 grams with any snack (total of any snack foods eaten during that time). Use MyFitness Pal donald to add up your carbs through the day. Do NOT drink any beverages with calories or carbs (this can lead to high blood sugar and weight gain). Also, some of our patients have been very successful with weight loss using the pre made/planned meal planning services that limit calories and portion size (one example is Sensible Meals but there are many out there)  3. Exercise, 5 days per week, 30 minutes per day, as tolerated  4. Recommend good cholesterol, blood pressure, blood sugar levels     *If statins are being considered for HLD, statins are safe in patients with liver disease. Statins can be used to treat dyslipidemia in patients with both NAFLD and CHAMPAGNE.    In some people, fatty liver can progress to steatohepatitis (inflamatory fatty liver) and possibly to cirrhosis, putting one at increased risk for liver cancer, liver failure, and death. Therefore, the lifestyle changes are very important to decrease this risk.     Website with information about fatty liver and inflammation related to fatty liver (CHAMPAGNE) = www.nashtruth.i[employer] Reminder  AND www.NASHactually.com    Tips for low/moderate carbohydrate diet:  3 meals a day made up of the following:  -- Unlimited green vegetables, tomatoes, mushrooms, spaghetti squash, cauliflower, meat, poultry, seafood, eggs and hard cheeses.   -- Milk and plain yogurt  -- Dressings, seasonings, condiments, etc should have less than 2 g sugars.   -- Beans (1-1.5 cups) or nuts (1/4 cup): can have 1 x a day.   -- 1-2 servings of citrus fruits, berries, pineapple or melon a day (1/2 cup)  -- Avoid fried  foods    *No grains, rice, pasta, potatoes, bread, corn, peas, oatmeal, grits, tortillas, crackers, chips    *No soda, sweet tea, juices or lemonade    Try www.dietdoctor.[employer] for recipes (moderate carb intake)      ASSESSMENT & PLAN     50 y.o. White female with:    1. Fatty liver, likely related to metabolic risk factors  -- Outside U/S shows suggests normal size liver although not measured in interpretation; the common duct measures 6.1 mm, within normal limits   -- Transaminases ALT>AST  -- Synthetic liver function WNL  -- Risk factors for fatty liver disease include obesity, HTN  -- Immunity to Hep A and B : Will check immunity markers for HBV/HAV and arrange for vaccination if needed  -- Fibroscan today  -- Recommendations discussed with patient as above  -- no current signs or symptoms of portal htn or hepatic decompensation     FIB-4 score = 2    2. Elevated liver enzymes  -- sero workup unremarkable  -- continues to have elevated liver enzymes  -- see discussion of tamoxifen below   -- I discussed that continued elevation of her liver enzymes >6mo may warrant a biopsy. The patient is very anxious about undergoing procedures and last time she had to get an IV aborted the procedure. Declines biopsy     3. Obesity, HTN   -- Body mass index is 45.85 kg/m².   -- increases risk for fatty liver  -- referral to nutritionist and bariatric medicine   -- strict compliance of medication imperative   -- discussed continue walking and watching what you eat     4. . Abnormal MRI  -- recommend repeat MRI for flash filling defect seen prior. This will give us some imaging of liver and spleen as well   -- recommend MRI elastography at the same time for fibrosis staging   -- will rx benzo for anxiety and claustraphobia. Must have someone drive her to and from procedure    6. Tamoxifen use since 10/2019   -- I am not convinced tamoxifen is the source of these elevated liver enzymes as she reports lack of adherence to this  medication. She intermittently takes the medication as it is, and has been prescribed it since October 2019. She has room for improvement in terms of CHAMPAGNE risk factors and also reports noncompliance of other medications  -- per livertox, elevated enzymes typically arise within first six months of treatment   -- her oncologist reportedly stopped her tamoxifen two weeks ago due to elevated liver enzymes  -- could be contributing to fatty liver    -- LiverTox:   Tamoxifen has been associated with rare instances of idiosyncratic, clinically apparent liver injury, typically arising within the first six months of treatment and having variable presentations with cholestatic, mixed or hepatocellular pattern of enzyme elevations.  Some instances have been severe with signs of hepatic failure, but most cases are self-limited. Long term tamoxifen therapy has also been linked to isolated cases of peliosis hepatis, hepatic cysts and several cases of hepatocellular carcinoma in women with no other risk factors for this tumor.     However, in large retrospective analyses, no increase in hepatocellular carcinoma in women taking tamoxifen for 5 years has been demonstrated.... Tamoxifen therapy has also been linked to the development of fatty liver and steatohepatitis.            Orders Placed This Encounter   Procedures    MR Elastography    MRI Abdomen W WO Contrast    Ambulatory referral/consult to Bariatric Medicine    Ambulatory referral/consult to Nutrition Services         Recommend 25 lb weight loss   If liver enzymes persist in 3 months with concerted weight loss effort, may have to biopsy liver   Referral to nutrition services & bariatric medicine  MRI w/wo contrast as well as MRI elastography       F/u depending on results of MRI    Thank you for allowing me to participate in the care of Augustina Leo Meléndez PA-C  Hepatology

## 2020-10-01 NOTE — PATIENT INSTRUCTIONS
1. MRI elastography and MRI of the abdomen soon. Will send a prescription for medication that can help you relax prior to MRI   2. Weight loss imperative, recommend 10 lbs over the next 3 months   3. Referring to nutritionist and bariatric medicine   4. Follow-up with you after the MRI       There is no FDA approved therapy for non-alcoholic fatty liver disease. Therefore, these things are important:  1. Weight loss goal of 25 lbs  2. Low carb/sugar, high fiber and protein diet.Try to limit your carb intake to LESS than 30-45 grams of carbs with a meal or LESS than 5-10 grams with any snack (total of any snack foods eaten during that time). Use MyFitness Pal donald to add up your carbs through the day. Do NOT drink any beverages with calories or carbs (this can lead to high blood sugar and weight gain). Also, some of our patients have been very successful with weight loss using the pre made/planned meal planning services that limit calories and portion size (one example is Sensible Meals but there are many out there)  3. Exercise, 5 days per week, 30 minutes per day, as tolerated  4. Recommend good cholesterol, blood pressure, blood sugar levels     *If statins are being considered for HLD, statins are safe in patients with liver disease. Statins can be used to treat dyslipidemia in patients with both NAFLD and CHAMPAGNE.    In some people, fatty liver can progress to steatohepatitis (inflamatory fatty liver) and possibly to cirrhosis, putting one at increased risk for liver cancer, liver failure, and death. Therefore, the lifestyle changes are very important to decrease this risk.     Website with information about fatty liver and inflammation related to fatty liver ([place]) = www.nashtruth.com  AND www.NASHactually.com    Tips for low/moderate carbohydrate diet:  3 meals a day made up of the following:  -- Unlimited green vegetables, tomatoes, mushrooms, spaghetti squash, cauliflower, meat, poultry, seafood, eggs and hard  cheeses.   -- Milk and plain yogurt  -- Dressings, seasonings, condiments, etc should have less than 2 g sugars.   -- Beans (1-1.5 cups) or nuts (1/4 cup): can have 1 x a day.   -- 1-2 servings of citrus fruits, berries, pineapple or melon a day (1/2 cup)  -- Avoid fried foods    *No grains, rice, pasta, potatoes, bread, corn, peas, oatmeal, grits, tortillas, crackers, chips    *No soda, sweet tea, juices or lemonade    Try www.dietdoctor.[employer] for recipes (moderate carb intake)

## 2020-10-02 ENCOUNTER — TELEPHONE (OUTPATIENT)
Dept: HEPATOLOGY | Facility: CLINIC | Age: 50
End: 2020-10-02

## 2020-10-02 DIAGNOSIS — F40.240 CLAUSTROPHOBIA: Primary | ICD-10-CM

## 2020-10-02 RX ORDER — ALPRAZOLAM 0.5 MG/1
0.5 TABLET ORAL ONCE
Qty: 2 TABLET | Refills: 0 | Status: CANCELLED | OUTPATIENT
Start: 2020-10-02 | End: 2020-10-02

## 2020-10-05 DIAGNOSIS — F41.9 ANXIETY: Primary | ICD-10-CM

## 2020-10-05 RX ORDER — ALPRAZOLAM 0.5 MG/1
0.5 TABLET ORAL 2 TIMES DAILY PRN
Qty: 2 TABLET | Refills: 0 | Status: SHIPPED | OUTPATIENT
Start: 2020-10-05 | End: 2020-11-06

## 2020-10-06 NOTE — TELEPHONE ENCOUNTER
Received message from Jayda CASTRO. Patient has not read Portal Message sent on 10/2/20.  Please contact patient to review message regarding medication to take prior to MRI. Go and  Prescription. Referrals to Bariatric Nutrition and Weight Management. Telephone numbers to referrals included in message.    Patient called on home/mobile number [phone]. No Answer. Cannot leave a VM message. Mail Box Full.    Message from Jayda CASTRO mailed out to the Patient along with Appt reminders for the MRI and MRI Elastography scheduled for Wed 10/14/20 at 8:45 am and 9:15 am.

## 2020-10-14 ENCOUNTER — HOSPITAL ENCOUNTER (OUTPATIENT)
Dept: RADIOLOGY | Facility: HOSPITAL | Age: 50
Discharge: HOME | End: 2020-10-14
Attending: PHYSICIAN ASSISTANT
Payer: MEDICAID

## 2020-10-14 DIAGNOSIS — K76.9 LIVER LESION, RIGHT LOBE: ICD-10-CM

## 2020-10-14 DIAGNOSIS — K76.0 FATTY LIVER: ICD-10-CM

## 2020-10-14 PROCEDURE — 76391 MR ELASTOGRAPHY: ICD-10-PCS | Mod: 26,,, | Performed by: RADIOLOGY

## 2020-10-14 PROCEDURE — 76391 MR ELASTOGRAPHY: CPT | Mod: TC

## 2020-10-14 PROCEDURE — 74183 MRI ABD W/O CNTR FLWD CNTR: CPT | Mod: TC

## 2020-10-14 PROCEDURE — 25500020 PHARM REV CODE 255: Performed by: PHYSICIAN ASSISTANT

## 2020-10-14 PROCEDURE — A9585 GADOBUTROL INJECTION: HCPCS | Performed by: PHYSICIAN ASSISTANT

## 2020-10-14 PROCEDURE — 74183 MRI ABDOMEN W WO CONTRAST: ICD-10-PCS | Mod: 26,,, | Performed by: RADIOLOGY

## 2020-10-14 PROCEDURE — 76391 MR ELASTOGRAPHY: CPT | Mod: 26,,, | Performed by: RADIOLOGY

## 2020-10-14 PROCEDURE — 74183 MRI ABD W/O CNTR FLWD CNTR: CPT | Mod: 26,,, | Performed by: RADIOLOGY

## 2020-10-14 RX ORDER — GADOBUTROL 604.72 MG/ML
10 INJECTION INTRAVENOUS
Status: COMPLETED | OUTPATIENT
Start: 2020-10-14 | End: 2020-10-14

## 2020-10-14 RX ADMIN — GADOBUTROL 10 ML: 604.72 INJECTION INTRAVENOUS at 09:10

## 2020-11-06 ENCOUNTER — LAB VISIT (OUTPATIENT)
Dept: LAB | Facility: HOSPITAL | Age: 50
End: 2020-11-06
Payer: MEDICAID

## 2020-11-06 ENCOUNTER — OFFICE VISIT (OUTPATIENT)
Dept: HEPATOLOGY | Facility: CLINIC | Age: 50
End: 2020-11-06
Payer: MEDICAID

## 2020-11-06 VITALS
WEIGHT: 247.13 LBS | BODY MASS INDEX: 45.48 KG/M2 | DIASTOLIC BLOOD PRESSURE: 85 MMHG | OXYGEN SATURATION: 96 % | HEIGHT: 62 IN | HEART RATE: 73 BPM | SYSTOLIC BLOOD PRESSURE: 132 MMHG

## 2020-11-06 DIAGNOSIS — K76.0 FATTY LIVER: ICD-10-CM

## 2020-11-06 DIAGNOSIS — I10 HYPERTENSION, UNSPECIFIED TYPE: ICD-10-CM

## 2020-11-06 DIAGNOSIS — R74.8 ELEVATED LIVER ENZYMES: Primary | ICD-10-CM

## 2020-11-06 DIAGNOSIS — E66.01 CLASS 3 SEVERE OBESITY WITH BODY MASS INDEX (BMI) OF 40.0 TO 44.9 IN ADULT, UNSPECIFIED OBESITY TYPE, UNSPECIFIED WHETHER SERIOUS COMORBIDITY PRESENT: ICD-10-CM

## 2020-11-06 DIAGNOSIS — R74.8 ELEVATED LIVER ENZYMES: ICD-10-CM

## 2020-11-06 LAB
ALBUMIN SERPL BCP-MCNC: 3.8 G/DL (ref 3.5–5.2)
ALP SERPL-CCNC: 104 U/L (ref 55–135)
ALT SERPL W/O P-5'-P-CCNC: 191 U/L (ref 10–44)
AST SERPL-CCNC: 132 U/L (ref 10–40)
BILIRUB DIRECT SERPL-MCNC: 0.4 MG/DL (ref 0.1–0.3)
BILIRUB SERPL-MCNC: 0.7 MG/DL (ref 0.1–1)
PROT SERPL-MCNC: 7.9 G/DL (ref 6–8.4)

## 2020-11-06 PROCEDURE — 99999 PR PBB SHADOW E&M-EST. PATIENT-LVL III: ICD-10-PCS | Mod: PBBFAC,,, | Performed by: PHYSICIAN ASSISTANT

## 2020-11-06 PROCEDURE — 99213 OFFICE O/P EST LOW 20 MIN: CPT | Mod: S$PBB,,, | Performed by: PHYSICIAN ASSISTANT

## 2020-11-06 PROCEDURE — 36415 COLL VENOUS BLD VENIPUNCTURE: CPT

## 2020-11-06 PROCEDURE — 80076 HEPATIC FUNCTION PANEL: CPT

## 2020-11-06 PROCEDURE — 99999 PR PBB SHADOW E&M-EST. PATIENT-LVL III: CPT | Mod: PBBFAC,,, | Performed by: PHYSICIAN ASSISTANT

## 2020-11-06 PROCEDURE — 99213 OFFICE O/P EST LOW 20 MIN: CPT | Mod: PBBFAC | Performed by: PHYSICIAN ASSISTANT

## 2020-11-06 PROCEDURE — 99213 PR OFFICE/OUTPT VISIT, EST, LEVL III, 20-29 MIN: ICD-10-PCS | Mod: S$PBB,,, | Performed by: PHYSICIAN ASSISTANT

## 2020-11-06 RX ORDER — AMLODIPINE BESYLATE 10 MG/1
10 TABLET ORAL DAILY
COMMUNITY
End: 2023-10-23 | Stop reason: CLARIF

## 2020-11-06 NOTE — PATIENT INSTRUCTIONS
1. MRI shows benign hemangioma (nothing that looks like liver cancer at this time). Your MRI elastography shows that you do not have significant scarring of the liver.   2. Will repeat your labs today, and liver labs repeated again in 3 months.   3. Recommend weight loss, as below.  4. I  recommend referrals to Bariatric Medicine and a nutritionist. Please call [phone] for Bariatric Medicine weight management program. To schedule with the nutritionist, please either email [email or web address] to schedule or call [facility] in [place]: [phone] and the  will transfer the call to one of the dieticians.       There is no FDA approved therapy for non-alcoholic fatty liver disease. Therefore, these things are important:  1. Weight loss goal of 24 lbs  2. Low carb/sugar, high fiber and protein diet.Try to limit your carb intake to LESS than 30-45 grams of carbs with a meal or LESS than 5-10 grams with any snack (total of any snack foods eaten during that time). Use MyFitness Pal donald to add up your carbs through the day. Do NOT drink any beverages with calories or carbs (this can lead to high blood sugar and weight gain). Also, some of our patients have been very successful with weight loss using the pre made/planned meal planning services that limit calories and portion size (one example is Sensible Meals but there are many out there)  3. Exercise, 5 days per week, 30 minutes per day, as tolerated  4. Recommend good cholesterol, blood pressure, blood sugar levels       *If statins are being considered for HLD, statins are safe in patients with liver disease. Statins can be used to treat dyslipidemia in patients with both NAFLD and CHAMPAGNE.    In some people, fatty liver can progress to steatohepatitis (inflamatory fatty liver) and possibly to cirrhosis, putting one at increased risk for liver cancer, liver failure, and death. Therefore, the lifestyle changes are very important to decrease this  risk.     Website with information about fatty liver and inflammation related to fatty liver ([place]) = www.nashtruth.[employer]  AND www.NASHactually.com    Tips for low/moderate carbohydrate diet:  3 meals a day made up of the following:  -- Unlimited green vegetables, tomatoes, mushrooms, spaghetti squash, cauliflower, meat, poultry, seafood, eggs and hard cheeses.   -- Milk and plain yogurt  -- Dressings, seasonings, condiments, etc should have less than 2 g sugars.   -- Beans (1-1.5 cups) or nuts (1/4 cup): can have 1 x a day.   -- 1-2 servings of citrus fruits, berries, pineapple or melon a day (1/2 cup)  -- Avoid fried foods    *No grains, rice, pasta, potatoes, bread, corn, peas, oatmeal, grits, tortillas, crackers, chips    *No soda, sweet tea, juices or lemonade    Try www.dietdoctor.[employer] for recipes (moderate carb intake)

## 2020-11-06 NOTE — PROGRESS NOTES
[facility] - Est Patient     LAST CLINIC VISIT: 8/4/2020 with me    CHIEF COMPLAINT: Fatty liver, elevated liver enzymes on tamoxifen therapy, abnormal MRI imaging      HPI: Augustina Bailey is a 50 y.o. F returning for evaluation of fatty liver and elevated liver enzymes in the context of chronic tamoxifen therapy (since 10/2019) after having breast cancer, although the patient reports minimal adherence to tamoxifen therapy. Notably, she has had significant weight gain of 30 lbs in the past year and intermittently takes her HTN medications.     Fibrosis staging:   MRI elastrography 10/14/2020: F0-1, moderate to severe steatosis  Fibroscan: 8/4/2020: F4, S3 (likely limited by body habitus)     I reviewed her outside records:   · I note her history of L breast cancer (invasive ductal left breast carcinoma dx 7/2018) s/p 4 cycles of neoadjuvant chemotherapy, left mastectomy + SLNB; as well as completed radiation treament 4/2019. She began tamoxifen 10/2019, prescribed daily. Per chart review the patient has had side effects of hot flashes, mood swings, and weight gain and so takes tamoxifen intermittently. Declined lupron therapy.  CTA angiogram prior to flap revision (7/3/19) showed an incidental 5mm flash filling defect in right lobe of the liver in 7/2019; follow up was recommended 3-6 months from then but from what I gather, not completed.  · Elevated liver enzymes since at least 2018 with ultrasound in 6/2019 suggesting fatty liver (prior to tamoxifen use), no evidence of cirrhosis or masses  · Most recent outside ultrasound 5/28/20 suggests normal size liver although not measured in interpretation; the common duct measures 6.1 mm, within normal limits       Interval history:  She is here today alone. The patient feels well, denies symptoms of hepatic decompensation including jaundice, ascites, cognitive problems to suggest hepatic encephalopathy, or GI bleeding. No pruritus or new rashes. ROS as  "below.     Still off tamoxifen since mid-2020.     She reports she "barely" was taking it since it was prescribed 2019.   Did not take her blood pressure pills (norvasc) today.     Supplements/herbal: denies   Diet/Exercise: starting walking more and eating salads and chicken.The patient is the "heaviest she has ever been." Specifically she has gained 30 lbs since chemo started [id number].    ETOH: Stopped drinking alcohol 10 years ago        Review of patient's allergies indicates:  No Known Allergies     Current Outpatient Medications on File Prior to Visit   Medication Sig Dispense Refill    amLODIPine (NORVASC) 10 MG tablet Take 10 mg by mouth once daily.      [DISCONTINUED] ALPRAZolam (XANAX) 0.5 MG tablet Take 1 tablet (0.5 mg total) by mouth 2 (two) times daily as needed for Anxiety. 2 tablet 0     No current facility-administered medications on file prior to visit.          PMHX:  has a past medical history of Anemia, Cancer, Encounter for blood transfusion, Heart murmur, Hypertension, and Interstitial cystitis.    PSHX:  has a past surgical history that includes  section; Hernia repair; Hysterectomy; Breast surgery; Insertion of tunneled central venous catheter (CVC) with subcutaneous port (N/A, 2018); Simple mastectomy (Left, 2019); Arabi lymph node biopsy (Left, 2019); Injection for sentinel node identification (Left, 2019); and Insertion of breast tissue expander (Left, 2019).    FAMILY HISTORY:   Family History   Problem Relation Age of Onset    Heart failure Mother     Breast cancer Neg Hx     Colon cancer Neg Hx     Ovarian cancer Neg Hx        Review of Systems   Constitutional: Negative for appetite change, chills, fatigue, fever.   Eyes: Negative for visual disturbance.   Respiratory: Negative for cough and shortness of breath.    Cardiovascular: Negative for chest pain, palpitations and leg swelling.   Gastrointestinal: Negative for abdominal " "distention, abdominal pain, blood in stool, constipation, diarrhea, nausea and vomiting.  Genitourinary: Negative for dysuria and hematuria. Denies dark urine.   Musculoskeletal: Negative for arthralgias, gait problem, joint swelling.   Skin: Negative for color change, rash and wound. Denies itching.   Neurological: Negative for dizziness, tremors, speech difficulty, and headaches.   Hematological: Does not bruise/bleed easily.   Psychiatric/Behavioral: Negative for confusion, decreased concentration and sleep disturbance. Denies memory loss. Denies depression.     DATA   Physical Exam   Constitutional: Friendly adult female, appears stated age. Obese. No distress. Alert and oriented to person, place, and time.  Eyes: No scleral icterus.   Cardiovascular: Normal rate, regular rhythm and normal heart sounds. No murmur heard.  Pulmonary/Chest: Respiratory effort and breath sounds normal. No respiratory distress.   Abdominal: Obese, soft, non-tender. No distension; no ascites appreciated. There is no palpable hepatosplenomegaly. No hernia or mass.   Musculoskeletal: No edema.   Neurological: No tremor. Coordination and gait normal.  Skin: Skin is warm and dry. No rash or erythema. No jaundice. No telangiectasias or palmar erythema noted.  Psychiatric: Normal mood and affect. Speech, behavior, and thought content normal. No depression or anxiety noted.     /85 (BP Location: Right arm, Patient Position: Sitting, BP Method: Medium (Automatic))   Pulse 73   Ht 5' 2" (1.575 m)   Wt 112.1 kg (247 lb 2.2 oz)   SpO2 96%   BMI 45.20 kg/m²        DIAGNOSTIC STUDIES:  Abd. Ultrasound -  Date: 5/28/2020  FINDINGS:  Inferior Thorax: Normal.     Liver: Diffuse loss of signal on out of phase imaging consistent with steatosis.  8 mm focus of T2 hyperintensity and arterial enhancement within the right hepatic lobe (series 7, image 36) which becomes persist on venous and delayed phase imaging consistent with a hemangioma.  " Subcentimeter T2 hyperintense lesion at the hepatic dome is too small to adequately characterize however likely represents a small cyst.  Portal vein is patent.  Gallbladder: No gallstones.  Bile Ducts: No dilatation.  Pancreas: No mass. No peripancreatic fat stranding.  Spleen: Normal.  Small splenule.  Adrenals: Normal.  Kidneys/Ureters: No mass or hydroureteronephrosis.  Several bilateral simple renal cysts.  GI Tract/Mesentery: No evidence of bowel obstruction or inflammation.  Peritoneal Space: No ascites.  Retroperitoneum: No pathologically enlarged nodes.  Abdominal wall: Normal.  Vasculature: No aneurysm.  Bones: No significant abnormality.     Impression:     1. Subcentimeter enhancing focus within the right hepatic lobe, as above, suggestive of a benign hemangioma.  No concerning liver lesions identified.  2. Hepatic steatosis.  3. Simple renal cysts.     EDUCATION / DISCUSSION:    There is no FDA approved therapy for non-alcoholic fatty liver disease. Therefore, these things are important:  1. Weight loss goal of 25 lbs  2. Low carb/sugar, high fiber and protein diet.Try to limit your carb intake to LESS than 30-45 grams of carbs with a meal or LESS than 5-10 grams with any snack (total of any snack foods eaten during that time). Use Smartio Pal donald to add up your carbs through the day. Do NOT drink any beverages with calories or carbs (this can lead to high blood sugar and weight gain). Also, some of our patients have been very successful with weight loss using the pre made/planned meal planning services that limit calories and portion size (one example is Sensible Meals but there are many out there)  3. Exercise, 5 days per week, 30 minutes per day, as tolerated  4. Recommend good cholesterol, blood pressure, blood sugar levels     *If statins are being considered for HLD, statins are safe in patients with liver disease. Statins can be used to treat dyslipidemia in patients with both NAFLD and  CHAMPAGNE.    In some people, fatty liver can progress to steatohepatitis (inflamatory fatty liver) and possibly to cirrhosis, putting one at increased risk for liver cancer, liver failure, and death. Therefore, the lifestyle changes are very important to decrease this risk.     Website with information about fatty liver and inflammation related to fatty liver (CHAMPAGNE) = www.nashtruth.TechForward  AND www.NASHactually.com    Tips for low/moderate carbohydrate diet:  3 meals a day made up of the following:  -- Unlimited green vegetables, tomatoes, mushrooms, spaghetti squash, cauliflower, meat, poultry, seafood, eggs and hard cheeses.   -- Milk and plain yogurt  -- Dressings, seasonings, condiments, etc should have less than 2 g sugars.   -- Beans (1-1.5 cups) or nuts (1/4 cup): can have 1 x a day.   -- 1-2 servings of citrus fruits, berries, pineapple or melon a day (1/2 cup)  -- Avoid fried foods    *No grains, rice, pasta, potatoes, bread, corn, peas, oatmeal, grits, tortillas, crackers, chips    *No soda, sweet tea, juices or lemonade    Try www.dietdoctor.TechForward for recipes (moderate carb intake)      ASSESSMENT & PLAN     50 y.o. White female with:    1. Fatty liver with F0-1 Fibrosis based on MRI elastography; Fibroscan with F4 S3 likely overcall  -- Outside U/S shows suggests normal size liver although not measured in interpretation; the common duct measures 6.1 mm, within normal limits   -- Transaminases ALT>AST  -- Synthetic liver function WNL  -- Risk factors for fatty liver disease include obesity, HTN  -- no current signs or symptoms of portal htn or hepatic decompensation, no thrombocytopenia  -- FIB-4 score = 2  Risk factors of fatty liver include severe morbid obesity, HTN; tamoxifen use  -- Recommend 25 lb weight loss. We discussed healthy diet and increasing activity.  -- If liver enzymes persist in 3 months with concerted weight loss effort, may have to biopsy liver; patient is reluctant to pursue this and we have  discussed obtaining a liver biopsy at each of our visits.   -- Referral to nutrition services & bariatric medicine.       2. Elevated liver enzymes  -- likely due to fatty liver/CHAMPAGNE  -- sero workup unrevealing  -- see below for discussion of tamoxifen      3. Obesity, HTN   -- Body mass index is 45.2 kg/m².   -- increases risk for fatty liver  -- referral to nutritionist and bariatric medicine   -- strict compliance of medication imperative   -- discussed continue walking and watching what you eat     4. Liver hemangioma  -- stable, benign per recent MRI 10/2020  -- AFP WNL     6. Tamoxifen use since 10/2019   -- I am not convinced tamoxifen is the source of these elevated liver enzymes as she reports lack of adherence to this medication. She intermittently takes the medication as it is, and has been prescribed it since October 2019. She has room for improvement in terms of CHAMPAGNE risk factors and also reports noncompliance of other medications  -- per livertox, elevated enzymes typically arise within first six months of treatment   -- her oncologist reportedly stopped her tamoxifen due to elevated liver enzymes. I will reach out because risk/benefit discussion is imperative. At this time I do not recommend she stop tamoxifen.  -- could be contributing to fatty liver        Orders Placed This Encounter   Procedures    Hepatic Function Panel         Follow up in about 3 months (around 2/6/2021).      Thank you for allowing me to participate in the care of Augustina Meléndez PA-C  Hepatology

## 2020-12-12 ENCOUNTER — HOSPITAL ENCOUNTER (EMERGENCY)
Facility: HOSPITAL | Age: 50
Discharge: HOME | End: 2020-12-12
Attending: EMERGENCY MEDICINE
Payer: MEDICAID

## 2020-12-12 VITALS
BODY MASS INDEX: 46.01 KG/M2 | OXYGEN SATURATION: 97 % | HEART RATE: 68 BPM | SYSTOLIC BLOOD PRESSURE: 170 MMHG | WEIGHT: 250 LBS | HEIGHT: 62 IN | TEMPERATURE: 98 F | DIASTOLIC BLOOD PRESSURE: 85 MMHG | RESPIRATION RATE: 20 BRPM

## 2020-12-12 DIAGNOSIS — N20.1 RIGHT URETERAL STONE: Primary | ICD-10-CM

## 2020-12-12 PROBLEM — C50.812 MALIGNANT NEOPLASM OF OVERLAPPING SITES OF LEFT FEMALE BREAST: Status: ACTIVE | Noted: 2020-12-12

## 2020-12-12 LAB
ALBUMIN SERPL BCP-MCNC: 3.7 G/DL (ref 3.5–5.2)
ALP SERPL-CCNC: 81 U/L (ref 55–135)
ALT SERPL W/O P-5'-P-CCNC: 148 U/L (ref 10–44)
ANION GAP SERPL CALC-SCNC: 10 MMOL/L (ref 8–16)
ANION GAP SERPL CALC-SCNC: 13 MMOL/L (ref 8–16)
AST SERPL-CCNC: 125 U/L (ref 10–40)
BASOPHILS # BLD AUTO: 0.03 K/UL (ref 0–0.2)
BASOPHILS NFR BLD: 0.3 % (ref 0–1.9)
BILIRUB SERPL-MCNC: 0.5 MG/DL (ref 0.1–1)
BILIRUB UR QL STRIP: NEGATIVE
BUN SERPL-MCNC: 10 MG/DL (ref 6–20)
BUN SERPL-MCNC: 10 MG/DL (ref 6–30)
CALCIUM SERPL-MCNC: 8.6 MG/DL (ref 8.7–10.5)
CHLORIDE SERPL-SCNC: 106 MMOL/L (ref 95–110)
CHLORIDE SERPL-SCNC: 106 MMOL/L (ref 95–110)
CLARITY UR: CLEAR
CO2 SERPL-SCNC: 22 MMOL/L (ref 23–29)
COLOR UR: ABNORMAL
CREAT SERPL-MCNC: 0.8 MG/DL (ref 0.5–1.4)
CREAT SERPL-MCNC: 0.8 MG/DL (ref 0.5–1.4)
DIFFERENTIAL METHOD: ABNORMAL
EOSINOPHIL # BLD AUTO: 0 K/UL (ref 0–0.5)
EOSINOPHIL NFR BLD: 0.2 % (ref 0–8)
ERYTHROCYTE [DISTWIDTH] IN BLOOD BY AUTOMATED COUNT: 12.5 % (ref 11.5–14.5)
EST. GFR  (AFRICAN AMERICAN): >60 ML/MIN/1.73 M^2
EST. GFR  (NON AFRICAN AMERICAN): >60 ML/MIN/1.73 M^2
GLUCOSE SERPL-MCNC: 113 MG/DL (ref 70–110)
GLUCOSE SERPL-MCNC: 121 MG/DL (ref 70–110)
GLUCOSE UR QL STRIP: NEGATIVE
HCT VFR BLD AUTO: 46.6 % (ref 37–48.5)
HCT VFR BLD CALC: 46 %PCV (ref 36–54)
HGB BLD-MCNC: 15.6 G/DL (ref 12–16)
HGB UR QL STRIP: ABNORMAL
IMM GRANULOCYTES # BLD AUTO: 0.04 K/UL (ref 0–0.04)
IMM GRANULOCYTES NFR BLD AUTO: 0.4 % (ref 0–0.5)
KETONES UR QL STRIP: ABNORMAL
LEUKOCYTE ESTERASE UR QL STRIP: NEGATIVE
LIPASE SERPL-CCNC: 20 U/L (ref 4–60)
LYMPHOCYTES # BLD AUTO: 1.2 K/UL (ref 1–4.8)
LYMPHOCYTES NFR BLD: 11.5 % (ref 18–48)
MCH RBC QN AUTO: 30.2 PG (ref 27–31)
MCHC RBC AUTO-ENTMCNC: 33.5 G/DL (ref 32–36)
MCV RBC AUTO: 90 FL (ref 82–98)
MICROSCOPIC COMMENT: ABNORMAL
MONOCYTES # BLD AUTO: 0.5 K/UL (ref 0.3–1)
MONOCYTES NFR BLD: 5.2 % (ref 4–15)
NEUTROPHILS # BLD AUTO: 8.5 K/UL (ref 1.8–7.7)
NEUTROPHILS NFR BLD: 82.4 % (ref 38–73)
NITRITE UR QL STRIP: NEGATIVE
NRBC BLD-RTO: 0 /100 WBC
PH UR STRIP: 8 [PH] (ref 5–8)
PLATELET # BLD AUTO: 216 K/UL (ref 150–350)
PMV BLD AUTO: 11.2 FL (ref 9.2–12.9)
POC IONIZED CALCIUM: 1.23 MMOL/L (ref 1.06–1.42)
POC TCO2 (MEASURED): 26 MMOL/L (ref 23–29)
POTASSIUM BLD-SCNC: 3.8 MMOL/L (ref 3.5–5.1)
POTASSIUM SERPL-SCNC: 5.9 MMOL/L (ref 3.5–5.1)
PROT SERPL-MCNC: 8.7 G/DL (ref 6–8.4)
PROT UR QL STRIP: NEGATIVE
RBC # BLD AUTO: 5.16 M/UL (ref 4–5.4)
RBC #/AREA URNS HPF: 8 /HPF (ref 0–4)
SAMPLE: ABNORMAL
SODIUM BLD-SCNC: 140 MMOL/L (ref 136–145)
SODIUM SERPL-SCNC: 138 MMOL/L (ref 136–145)
SP GR UR STRIP: 1.01 (ref 1–1.03)
SQUAMOUS #/AREA URNS HPF: ABNORMAL /HPF
URN SPEC COLLECT METH UR: ABNORMAL
UROBILINOGEN UR STRIP-ACNC: NEGATIVE EU/DL
WBC # BLD AUTO: 10.28 K/UL (ref 3.9–12.7)
WBC #/AREA URNS HPF: 2 /HPF (ref 0–5)

## 2020-12-12 PROCEDURE — 99900035 HC TECH TIME PER 15 MIN (STAT)

## 2020-12-12 PROCEDURE — 99285 EMERGENCY DEPT VISIT HI MDM: CPT | Mod: 25

## 2020-12-12 PROCEDURE — 25000003 PHARM REV CODE 250: Performed by: PHYSICIAN ASSISTANT

## 2020-12-12 PROCEDURE — 93010 ELECTROCARDIOGRAM REPORT: CPT | Mod: ,,, | Performed by: INTERNAL MEDICINE

## 2020-12-12 PROCEDURE — 83690 ASSAY OF LIPASE: CPT

## 2020-12-12 PROCEDURE — 85014 HEMATOCRIT: CPT | Mod: 91

## 2020-12-12 PROCEDURE — 96361 HYDRATE IV INFUSION ADD-ON: CPT

## 2020-12-12 PROCEDURE — 84132 ASSAY OF SERUM POTASSIUM: CPT | Mod: 91

## 2020-12-12 PROCEDURE — 82330 ASSAY OF CALCIUM: CPT

## 2020-12-12 PROCEDURE — 85025 COMPLETE CBC W/AUTO DIFF WBC: CPT

## 2020-12-12 PROCEDURE — 93005 ELECTROCARDIOGRAM TRACING: CPT

## 2020-12-12 PROCEDURE — 93010 EKG 12-LEAD: ICD-10-PCS | Mod: ,,, | Performed by: INTERNAL MEDICINE

## 2020-12-12 PROCEDURE — 96375 TX/PRO/DX INJ NEW DRUG ADDON: CPT

## 2020-12-12 PROCEDURE — 81000 URINALYSIS NONAUTO W/SCOPE: CPT

## 2020-12-12 PROCEDURE — 84295 ASSAY OF SERUM SODIUM: CPT | Mod: 91

## 2020-12-12 PROCEDURE — 82565 ASSAY OF CREATININE: CPT | Mod: 91

## 2020-12-12 PROCEDURE — 80053 COMPREHEN METABOLIC PANEL: CPT

## 2020-12-12 PROCEDURE — 63600175 PHARM REV CODE 636 W HCPCS: Performed by: PHYSICIAN ASSISTANT

## 2020-12-12 PROCEDURE — 96374 THER/PROPH/DIAG INJ IV PUSH: CPT

## 2020-12-12 RX ORDER — ONDANSETRON 4 MG/1
8 TABLET, FILM COATED ORAL EVERY 12 HOURS PRN
Qty: 12 TABLET | Refills: 0 | Status: SHIPPED | OUTPATIENT
Start: 2020-12-12 | End: 2023-10-23 | Stop reason: CLARIF

## 2020-12-12 RX ORDER — KETOROLAC TROMETHAMINE 30 MG/ML
15 INJECTION, SOLUTION INTRAMUSCULAR; INTRAVENOUS
Status: COMPLETED | OUTPATIENT
Start: 2020-12-12 | End: 2020-12-12

## 2020-12-12 RX ORDER — TAMSULOSIN HYDROCHLORIDE 0.4 MG/1
0.4 CAPSULE ORAL DAILY
Qty: 10 CAPSULE | Refills: 0 | Status: SHIPPED | OUTPATIENT
Start: 2020-12-12 | End: 2023-10-23 | Stop reason: CLARIF

## 2020-12-12 RX ORDER — ONDANSETRON 2 MG/ML
8 INJECTION INTRAMUSCULAR; INTRAVENOUS
Status: COMPLETED | OUTPATIENT
Start: 2020-12-12 | End: 2020-12-12

## 2020-12-12 RX ORDER — MORPHINE SULFATE 4 MG/ML
3 INJECTION, SOLUTION INTRAMUSCULAR; INTRAVENOUS
Status: COMPLETED | OUTPATIENT
Start: 2020-12-12 | End: 2020-12-12

## 2020-12-12 RX ORDER — MELOXICAM 7.5 MG/1
7.5 TABLET ORAL DAILY
Qty: 12 TABLET | Refills: 0 | Status: SHIPPED | OUTPATIENT
Start: 2020-12-12 | End: 2023-10-23 | Stop reason: CLARIF

## 2020-12-12 RX ADMIN — SODIUM CHLORIDE 1000 ML: 0.9 INJECTION, SOLUTION INTRAVENOUS at 10:12

## 2020-12-12 RX ADMIN — KETOROLAC TROMETHAMINE 15 MG: 30 INJECTION, SOLUTION INTRAMUSCULAR at 10:12

## 2020-12-12 RX ADMIN — ONDANSETRON 8 MG: 2 INJECTION INTRAMUSCULAR; INTRAVENOUS at 10:12

## 2020-12-12 RX ADMIN — MORPHINE SULFATE 3 MG: 4 INJECTION, SOLUTION INTRAMUSCULAR; INTRAVENOUS at 10:12

## 2020-12-12 NOTE — ED PROVIDER NOTES
Encounter Date: 2020    SCRIBE #1 NOTE: I, Leisa Herrera, am scribing for, and in the presence of,  Asad Herbert PA-C. I have scribed the following portions of the note - Other sections scribed: HPI, ROS.       History     Chief Complaint   Patient presents with    Flank Pain     EMS reports pt c/o right flank pain starting today at 6am.      Augustina Bailey is a 50 y.o female presenting to the ED with a chief complaint of right sided flank pain that began around 6 this morning. The patient describes a constant pain on her right side that radiates to the RLQ. Patient states it feels like she is not always emptying her bladder when urinating. Patient denies nausea, vomiting, fever, cough, rhinorrhea, dysuria, or hematuria. No known drug allergies. Patient has a past medical history of hypertension, kidney issues, and left sided breast cancer. Patient's last round of chemotherapy was in 2018. Patient has a past surgical history of hysterectomy.     The history is provided by the patient.     Review of patient's allergies indicates:  No Known Allergies  Past Medical History:   Diagnosis Date    Anemia     Cancer     left breast    Encounter for blood transfusion     Heart murmur     Hypertension     Interstitial cystitis      Past Surgical History:   Procedure Laterality Date    BREAST SURGERY      biopsy     SECTION      x3    HERNIA REPAIR      HYSTERECTOMY      still have ovaries    INJECTION FOR SENTINEL NODE IDENTIFICATION Left 2019    Procedure: INJECTION, FOR SENTINEL NODE IDENTIFICATION;  Surgeon: Andre Fisher MD;  Location: [facility] OR;  Service: General;  Laterality: Left;  VERIFIED WITH DOUGLAS IN NUCLEAR MED---    INSERTION OF BREAST TISSUE EXPANDER Left 2019    Procedure: INSERTION, TISSUE EXPANDER, BREAST;  Surgeon: Sagar Odonnell MD;  Location: [facility] OR;  Service: Plastics;  Laterality: Left;    INSERTION OF TUNNELED CENTRAL VENOUS CATHETER (CVC) WITH SUBCUTANEOUS  PORT N/A 9/17/2018    Procedure: INSERTION, PORT-A-CATH;  Surgeon: Andre Gastelum MD;  Location: [facility] OR;  Service: General;  Laterality: N/A;  RN PRE OP 9-10-18-----NEED H/P    SENTINEL LYMPH NODE BIOPSY Left 2/6/2019    Procedure: BIOPSY, LYMPH NODE, SENTINEL;  Surgeon: Andre Gastelum MD;  Location: [facility] OR;  Service: General;  Laterality: Left;    SIMPLE MASTECTOMY Left 2/6/2019    Procedure: MASTECTOMY, SIMPLE;  Surgeon: Andre Gastelum MD;  Location: [facility] OR;  Service: General;  Laterality: Left;  joint case with Dr. Blas GASTELUM TO INJECT IN OR PER THEODORE @ 11:47AM ON 2-1-19     Family History   Problem Relation Age of Onset    Heart failure Mother     Breast cancer Neg Hx     Colon cancer Neg Hx     Ovarian cancer Neg Hx      Social History     Tobacco Use    Smoking status: Never Smoker    Smokeless tobacco: Never Used   Substance Use Topics    Alcohol use: No    Drug use: No     Review of Systems   Constitutional: Negative for fever.   HENT: Negative for rhinorrhea and sore throat.    Respiratory: Negative for cough and shortness of breath.    Cardiovascular: Negative for chest pain.   Gastrointestinal: Positive for abdominal pain. Negative for nausea and vomiting.   Genitourinary: Positive for difficulty urinating and flank pain. Negative for dysuria and hematuria.   Musculoskeletal: Negative for back pain.   Skin: Negative for rash.   Neurological: Negative for weakness.   Hematological: Does not bruise/bleed easily.       Physical Exam     Initial Vitals [12/12/20 1037]   BP Pulse Resp Temp SpO2   (!) 170/85 68 20 98.2 °F (36.8 °C) 97 %      MAP       --         Physical Exam    Nursing note and vitals reviewed.  Constitutional: She appears well-developed and well-nourished. She is not diaphoretic. No distress.   HENT:   Head: Normocephalic and atraumatic.   Nose: Nose normal.   Eyes: Conjunctivae and EOM are normal. Right eye exhibits no discharge. Left eye exhibits no discharge.    Neck: Normal range of motion. No tracheal deviation present. No JVD present.   Cardiovascular: Normal rate, regular rhythm and normal heart sounds. Exam reveals no friction rub.    No murmur heard.  Pulmonary/Chest: Breath sounds normal. No stridor. No respiratory distress. She has no wheezes. She has no rhonchi. She has no rales. She exhibits no tenderness.   Abdominal: Soft. She exhibits no distension. There is no abdominal tenderness. There is no rigidity, no rebound, no guarding, no CVA tenderness, no tenderness at McBurney's point and negative Castorena's sign.   Musculoskeletal: Normal range of motion.   Neurological: She is alert and oriented to person, place, and time.   Skin: Skin is warm and dry. No rash and no abscess noted. No erythema. No pallor.         ED Course   Procedures  Labs Reviewed   CBC W/ AUTO DIFFERENTIAL - Abnormal; Notable for the following components:       Result Value    Gran # (ANC) 8.5 (*)     Gran % 82.4 (*)     Lymph % 11.5 (*)     All other components within normal limits   COMPREHENSIVE METABOLIC PANEL - Abnormal; Notable for the following components:    Potassium 5.9 (*)     CO2 22 (*)     Glucose 113 (*)     Calcium 8.6 (*)     Total Protein 8.7 (*)      (*)      (*)     All other components within normal limits    Narrative:     Recoll. [id number] by KIANA at 12/12/2020 11:58, reason: Specimen   hemolyzed. SPOKE WITH YAMILA. 12/12/2020  11:58   URINALYSIS, REFLEX TO URINE CULTURE - Abnormal; Notable for the following components:    Ketones, UA 1+ (*)     Occult Blood UA 1+ (*)     All other components within normal limits    Narrative:     Specimen Source->Urine   URINALYSIS MICROSCOPIC - Abnormal; Notable for the following components:    RBC, UA 8 (*)     All other components within normal limits    Narrative:     Specimen Source->Urine   ISTAT PROCEDURE - Abnormal; Notable for the following components:    POC Glucose 121 (*)     All other components within normal  limits   LIPASE    Narrative:     Recoll. [id number] by AR1 at 12/12/2020 11:58, reason: Specimen   hemolyzed. SPOKE WITH YAMILA. 12/12/2020  11:58   ISTAT CHEM8          Imaging Results          CT Renal Stone Study ABD Pelvis WO (Final result)  Result time 12/12/20 11:40:06    Final result by Keri Esposito MD (12/12/20 11:40:06)                 Impression:      Mild right-sided hydroureteronephrosis secondary to a 3.5 mm right UVJ stone.    Punctate nonobstructing nephrolithiasis on the left.  Left renal cyst.    Hepatomegaly with fatty infiltration of the liver.      Electronically signed by: Keri Esposito MD  Date:    12/12/2020  Time:    11:40             Narrative:    EXAMINATION:  CT RENAL STONE STUDY ABD PELVIS WO    CLINICAL HISTORY:  Flank pain, kidney stone suspected;    TECHNIQUE:  Low dose axial images, sagittal and coronal reformations were obtained from the lung bases to the pubic symphysis.  Contrast was not administered.    COMPARISON:  10/14/2020    FINDINGS:  Bibasilar subsegmental atelectasis.  The base of the heart appears normal.    There is diffuse fatty infiltration of the slightly enlarged liver.  The spleen, pancreas, adrenal glands and kidneys are normal in size, shape and contour.  Left renal cyst.  There is mild right-sided hydroureteronephrosis related to a 3.5 mm right UVJ stone.  Punctate nonobstructing nephrolithiasis on the left.  Urinary bladder is partially distended and cannot be adequately evaluated.  Uterus has been removed.  Adnexal regions are unremarkable.    There is significant laxity of the anterior abdominal wall.  No free air, free fluid or obstruction is detected.  No pathologically enlarged abdominal or pelvic lymph nodes are seen.    Age-appropriate degenerative changes affect the skeleton.                                 Medical Decision Making:   History:   Old Medical Records: I decided to obtain old medical records.  Clinical Tests:   Lab Tests: Ordered and  Reviewed  Radiological Study: Ordered and Reviewed      This is an emergent evaluation of a 50 y.o. female presenting to the ED for paroxysmal flank pain. Denies fever and inability to void urine. Afebrile. Patient is non-toxic. Will treat symptoms while obtains labs, imaging, and monitoring patient.     Pain controlled in ED. Vitals stable. Well appearing.     CT shows R ureteral stone UVJ. No UTI or urosepsis. No ARIA. No acute intestinal obstruction, diverticulitis, appendicitis, acute cholecystitis, pancreatitis, mesenteric ischemia, incarcerated/strangulated hernia, perforated ulcer, AAA/dissection, or large malignant mass. No Herpes Zoster. Less consistent with torsion. Lower suspicion for lower lobe PNA. Hyperkalemia on CMP from hemolyzed sample; repeat K normal. No EKG changes consistent with hyperkalemia.     Discharged home with supportive care. Instructed to follow up with PCP and urology for reevaluation and management of symptoms. Advised patient to ensure they maintain adequate hydration.     I discussed with the patient the diagnosis, treatment plan, indications for return to the emergency department, and for expected follow-up. The patient verbalized an understanding. The patient is asked if there are any questions or concerns. We discuss the case, until all issues are addressed to the patients satisfaction. Patient understands and is agreeable to the plan.           Scribe Attestation:   Gricelibe #1: I performed the above scribed service and the documentation accurately describes the services I performed. I attest to the accuracy of the note.                      Clinical Impression:     ICD-10-CM ICD-9-CM   1. Right ureteral stone  N20.1 592.1                   1. Right ureteral stone            ED Disposition Condition    Discharge Stable       Scribe Attestation:  I, Asad Herbert PA-C, personally performed the services described in this documentation. All medical record entries made by the jesus  were at my direction and in my presence.  I have reviewed the chart and agree that the record reflects my personal performance and is accurate and complete.  ED Prescriptions     Medication Sig Dispense Start Date End Date Auth. Provider    ondansetron (ZOFRAN) 4 MG tablet Take 2 tablets (8 mg total) by mouth every 12 (twelve) hours as needed. 12 tablet 12/12/2020  Asad Herbert PA-C    tamsulosin (FLOMAX) 0.4 mg Cap Take 1 capsule (0.4 mg total) by mouth once daily. 10 capsule 12/12/2020 12/12/2021 Asad Herbert PA-C    meloxicam (MOBIC) 7.5 MG tablet Take 1 tablet (7.5 mg total) by mouth once daily. 12 tablet 12/12/2020  Asad Herbert PA-C        Follow-up Information     Follow up With Specialties Details Why Contact Info    Hermilo Rosado MD General Practice Schedule an appointment as soon as possible for a visit in 2 days For re-evaluation [address] [facility]  [place] [id number]  [phone]      [facility] Emergency Medicine Go to  If symptoms worsen [address]  [facility] [id number] [phone]                                       Asad Herbert PA-C  12/12/20 3085

## 2020-12-12 NOTE — ED TRIAGE NOTES
Pt presents to the ED via EMS reporting right sided plank pain that radiates to the right side of her back. Pt states that it started today and that it woke her up from her sleep this morning at 0400 after using the bathroom. Pt denies vomiting or diarrhea but reports nausea.

## 2021-03-29 ENCOUNTER — TELEPHONE (OUTPATIENT)
Dept: HEPATOLOGY | Facility: CLINIC | Age: 51
End: 2021-03-29

## 2021-03-29 DIAGNOSIS — R74.8 ELEVATED LIVER ENZYMES: Primary | ICD-10-CM

## 2021-03-29 DIAGNOSIS — K76.0 FATTY LIVER: ICD-10-CM

## 2021-04-14 ENCOUNTER — TELEPHONE (OUTPATIENT)
Dept: HEPATOLOGY | Facility: CLINIC | Age: 51
End: 2021-04-14

## 2021-04-15 ENCOUNTER — PATIENT MESSAGE (OUTPATIENT)
Dept: RESEARCH | Facility: HOSPITAL | Age: 51
End: 2021-04-15

## 2021-06-15 ENCOUNTER — TELEPHONE (OUTPATIENT)
Dept: HEPATOLOGY | Facility: CLINIC | Age: 51
End: 2021-06-15

## 2021-06-15 DIAGNOSIS — K76.0 FATTY LIVER: ICD-10-CM

## 2021-06-15 DIAGNOSIS — K74.00 LIVER FIBROSIS: Primary | ICD-10-CM

## 2021-06-17 ENCOUNTER — LAB VISIT (OUTPATIENT)
Dept: LAB | Facility: HOSPITAL | Age: 51
End: 2021-06-17
Attending: PHYSICIAN ASSISTANT
Payer: MEDICAID

## 2021-06-17 DIAGNOSIS — K74.00 LIVER FIBROSIS: ICD-10-CM

## 2021-06-17 DIAGNOSIS — R74.8 ELEVATED LIVER ENZYMES: ICD-10-CM

## 2021-06-17 DIAGNOSIS — K76.0 FATTY LIVER: ICD-10-CM

## 2021-06-17 LAB
ALBUMIN SERPL BCP-MCNC: 3.9 G/DL (ref 3.5–5.2)
ALP SERPL-CCNC: 85 U/L (ref 55–135)
ALT SERPL W/O P-5'-P-CCNC: 126 U/L (ref 10–44)
AST SERPL-CCNC: 71 U/L (ref 10–40)
BILIRUB DIRECT SERPL-MCNC: 0.3 MG/DL (ref 0.1–0.3)
BILIRUB SERPL-MCNC: 0.7 MG/DL (ref 0.1–1)
ESTIMATED AVG GLUCOSE: 120 MG/DL (ref 68–131)
HBA1C MFR BLD: 5.8 % (ref 4–5.6)
PROT SERPL-MCNC: 8 G/DL (ref 6–8.4)

## 2021-06-17 PROCEDURE — 83036 HEMOGLOBIN GLYCOSYLATED A1C: CPT | Performed by: PHYSICIAN ASSISTANT

## 2021-06-17 PROCEDURE — 80076 HEPATIC FUNCTION PANEL: CPT | Performed by: PHYSICIAN ASSISTANT

## 2021-06-17 PROCEDURE — 82105 ALPHA-FETOPROTEIN SERUM: CPT | Performed by: PHYSICIAN ASSISTANT

## 2021-06-17 PROCEDURE — 36415 COLL VENOUS BLD VENIPUNCTURE: CPT | Performed by: PHYSICIAN ASSISTANT

## 2021-06-18 LAB — AFP SERPL-MCNC: 5.7 NG/ML (ref 0–8.4)

## 2021-06-21 ENCOUNTER — OFFICE VISIT (OUTPATIENT)
Dept: HEPATOLOGY | Facility: CLINIC | Age: 51
End: 2021-06-21
Payer: MEDICAID

## 2021-06-21 VITALS
HEIGHT: 62 IN | WEIGHT: 242.75 LBS | HEART RATE: 64 BPM | OXYGEN SATURATION: 96 % | DIASTOLIC BLOOD PRESSURE: 71 MMHG | SYSTOLIC BLOOD PRESSURE: 131 MMHG | BODY MASS INDEX: 44.67 KG/M2 | RESPIRATION RATE: 18 BRPM | TEMPERATURE: 98 F

## 2021-06-21 DIAGNOSIS — E66.01 CLASS 3 SEVERE OBESITY WITH BODY MASS INDEX (BMI) OF 40.0 TO 44.9 IN ADULT, UNSPECIFIED OBESITY TYPE, UNSPECIFIED WHETHER SERIOUS COMORBIDITY PRESENT: ICD-10-CM

## 2021-06-21 DIAGNOSIS — R74.8 ELEVATED LIVER ENZYMES: ICD-10-CM

## 2021-06-21 DIAGNOSIS — K76.0 FATTY LIVER: Primary | ICD-10-CM

## 2021-06-21 DIAGNOSIS — K74.00 LIVER FIBROSIS: ICD-10-CM

## 2021-06-21 PROCEDURE — 99214 OFFICE O/P EST MOD 30 MIN: CPT | Mod: S$PBB,,, | Performed by: PHYSICIAN ASSISTANT

## 2021-06-21 PROCEDURE — 99214 PR OFFICE/OUTPT VISIT, EST, LEVL IV, 30-39 MIN: ICD-10-PCS | Mod: S$PBB,,, | Performed by: PHYSICIAN ASSISTANT

## 2021-06-21 PROCEDURE — 99999 PR PBB SHADOW E&M-EST. PATIENT-LVL V: CPT | Mod: PBBFAC,,, | Performed by: PHYSICIAN ASSISTANT

## 2021-06-21 PROCEDURE — 99999 PR PBB SHADOW E&M-EST. PATIENT-LVL V: ICD-10-PCS | Mod: PBBFAC,,, | Performed by: PHYSICIAN ASSISTANT

## 2021-06-21 PROCEDURE — 99215 OFFICE O/P EST HI 40 MIN: CPT | Mod: PBBFAC | Performed by: PHYSICIAN ASSISTANT

## 2021-06-21 RX ORDER — EXEMESTANE 25 MG/1
25 TABLET ORAL DAILY
COMMUNITY
Start: 2021-04-08 | End: 2023-10-23 | Stop reason: CLARIF

## 2021-07-01 ENCOUNTER — TELEPHONE (OUTPATIENT)
Dept: BARIATRICS | Facility: CLINIC | Age: 51
End: 2021-07-01

## 2021-07-12 ENCOUNTER — TELEPHONE (OUTPATIENT)
Dept: BARIATRICS | Facility: CLINIC | Age: 51
End: 2021-07-12

## 2021-07-28 ENCOUNTER — TELEPHONE (OUTPATIENT)
Dept: HEPATOLOGY | Facility: CLINIC | Age: 51
End: 2021-07-28

## 2022-04-18 ENCOUNTER — OFFICE VISIT (OUTPATIENT)
Dept: URGENT CARE | Facility: CLINIC | Age: 52
End: 2022-04-18
Payer: MEDICAID

## 2022-04-18 VITALS
BODY MASS INDEX: 26.13 KG/M2 | HEART RATE: 61 BPM | TEMPERATURE: 98 F | DIASTOLIC BLOOD PRESSURE: 96 MMHG | SYSTOLIC BLOOD PRESSURE: 161 MMHG | RESPIRATION RATE: 18 BRPM | WEIGHT: 142 LBS | HEIGHT: 62 IN | OXYGEN SATURATION: 96 %

## 2022-04-18 DIAGNOSIS — R30.0 DYSURIA: ICD-10-CM

## 2022-04-18 DIAGNOSIS — I10 PRIMARY HYPERTENSION: ICD-10-CM

## 2022-04-18 DIAGNOSIS — N30.01 ACUTE CYSTITIS WITH HEMATURIA: Primary | ICD-10-CM

## 2022-04-18 DIAGNOSIS — Z76.0 MEDICATION REFILL: ICD-10-CM

## 2022-04-18 LAB
BILIRUB UR QL STRIP: NEGATIVE
GLUCOSE UR QL STRIP: NEGATIVE
KETONES UR QL STRIP: NEGATIVE
LEUKOCYTE ESTERASE UR QL STRIP: NEGATIVE
PH, POC UA: 6.5 (ref 5–8)
POC BLOOD, URINE: POSITIVE
POC NITRATES, URINE: NEGATIVE
PROT UR QL STRIP: NEGATIVE
SP GR UR STRIP: 1.02 (ref 1–1.03)
UROBILINOGEN UR STRIP-ACNC: NORMAL (ref 0.1–1.1)

## 2022-04-18 PROCEDURE — 1160F RVW MEDS BY RX/DR IN RCRD: CPT | Mod: CPTII,S$GLB,, | Performed by: FAMILY MEDICINE

## 2022-04-18 PROCEDURE — 99203 PR OFFICE/OUTPT VISIT, NEW, LEVL III, 30-44 MIN: ICD-10-PCS | Mod: S$GLB,,, | Performed by: FAMILY MEDICINE

## 2022-04-18 PROCEDURE — 99203 OFFICE O/P NEW LOW 30 MIN: CPT | Mod: S$GLB,,, | Performed by: FAMILY MEDICINE

## 2022-04-18 PROCEDURE — 3077F PR MOST RECENT SYSTOLIC BLOOD PRESSURE >= 140 MM HG: ICD-10-PCS | Mod: CPTII,S$GLB,, | Performed by: FAMILY MEDICINE

## 2022-04-18 PROCEDURE — 3077F SYST BP >= 140 MM HG: CPT | Mod: CPTII,S$GLB,, | Performed by: FAMILY MEDICINE

## 2022-04-18 PROCEDURE — 1160F PR REVIEW ALL MEDS BY PRESCRIBER/CLIN PHARMACIST DOCUMENTED: ICD-10-PCS | Mod: CPTII,S$GLB,, | Performed by: FAMILY MEDICINE

## 2022-04-18 PROCEDURE — 81003 POCT URINALYSIS, DIPSTICK, AUTOMATED, W/O SCOPE: ICD-10-PCS | Mod: QW,S$GLB,, | Performed by: FAMILY MEDICINE

## 2022-04-18 PROCEDURE — 81003 URINALYSIS AUTO W/O SCOPE: CPT | Mod: QW,S$GLB,, | Performed by: FAMILY MEDICINE

## 2022-04-18 PROCEDURE — 1159F PR MEDICATION LIST DOCUMENTED IN MEDICAL RECORD: ICD-10-PCS | Mod: CPTII,S$GLB,, | Performed by: FAMILY MEDICINE

## 2022-04-18 PROCEDURE — 3080F PR MOST RECENT DIASTOLIC BLOOD PRESSURE >= 90 MM HG: ICD-10-PCS | Mod: CPTII,S$GLB,, | Performed by: FAMILY MEDICINE

## 2022-04-18 PROCEDURE — 3080F DIAST BP >= 90 MM HG: CPT | Mod: CPTII,S$GLB,, | Performed by: FAMILY MEDICINE

## 2022-04-18 PROCEDURE — 1159F MED LIST DOCD IN RCRD: CPT | Mod: CPTII,S$GLB,, | Performed by: FAMILY MEDICINE

## 2022-04-18 PROCEDURE — 3008F BODY MASS INDEX DOCD: CPT | Mod: CPTII,S$GLB,, | Performed by: FAMILY MEDICINE

## 2022-04-18 PROCEDURE — 3008F PR BODY MASS INDEX (BMI) DOCUMENTED: ICD-10-PCS | Mod: CPTII,S$GLB,, | Performed by: FAMILY MEDICINE

## 2022-04-18 RX ORDER — AMLODIPINE BESYLATE 5 MG/1
5 TABLET ORAL DAILY
Qty: 30 TABLET | Refills: 0 | Status: SHIPPED | OUTPATIENT
Start: 2022-04-18 | End: 2022-05-18

## 2022-04-18 RX ORDER — SULFAMETHOXAZOLE AND TRIMETHOPRIM 800; 160 MG/1; MG/1
1 TABLET ORAL 2 TIMES DAILY
Qty: 10 TABLET | Refills: 0 | Status: SHIPPED | OUTPATIENT
Start: 2022-04-18 | End: 2022-04-23

## 2022-04-18 NOTE — PATIENT INSTRUCTIONS
General Discharge Instructions   PLEASE READ YOUR DISCHARGE INSTRUCTIONS ENTIRELY AS IT CONTAINS IMPORTANT INFORMATION.  If you were prescribed a narcotic or controlled medication, do not drive or operate heavy equipment or machinery while taking these medications.  If you were prescribed antibiotics, please take them to completion.  You must understand that you've received an Urgent Care treatment only and that you may be released before all your medical problems are known or treated. You, the patient, will arrange for follow up care as instructed.    OVER THE COUNTER RECOMMENDATIONS/SUGGESTIONS.    Make sure to stay well hydrated.    Use Nasal Saline to mechanically move any post nasal drip from your eustachian tube or from the back of your throat.    Use warm salt water gargles to ease your throat pain. Warm salt water gargles as needed for sore throat- 1/2 tsp salt to 1 cup warm water, gargle as desired.    Use an antihistamine such as Claritin, Zyrtec or Allegra to dry you out.    Use pseudoephedrine (behind the counter) to decongest. Pseudoephedrine 30 mg up to 240 mg /day. It can raise your blood pressure and give you palpitations.    Use mucinex (guaifenesin) to break up mucous up to 2400mg/day to loosen any mucous.    The mucinex DM pill has a cough suppressant that can be sedating. It can be used at night to stop the tickle at the back of your throat.    You can use Mucinex D (it has guaifenesin and a high dose of pseudoephedrine) in the mornings to help decongest.    Use Afrin in each nare for no longer than 3 days, as it is addictive. It can also dry out your mucous membranes and cause elevated blood pressure. This is especially useful if you are flying.    Use Flonase 1-2 sprays/nostril per day. It is a local acting steroid nasal spray, if you develop a bloody nose, stop using the medication immediately.    Sometimes Nyquil at night is beneficial to help you get some rest, however it is sedating and it  does have an antihistamine, and tylenol.    Honey is a natural cough suppressant that can be used.    Tylenol up to 4,000 mg a day is safe for short periods and can be used for body aches, pain, and fever. However in high doses and prolonged use it can cause liver irritation.    Ibuprofen is a non-steroidal anti-inflammatory that can be used for body aches, pain, and fever.However it can also cause stomach irritation if over used.     Follow up with your PCP or specialty clinic as instructed in the next 2-3 days if not improved or as needed. You can call [phone] to schedule an appointment with appropriate provider.      If you condition worsens, we recommend that you receive another evaluation at the emergency room immediately or contact your primary medical clinic's after hours call service to discuss your concerns.      Please return here or go to the Emergency Department for any concerns or worsening condition.   You can also call [phone] to schedule an appointment with the appropriate provider.    Please return here or go to the Emergency Department for any concerns or worsening of condition.    Thank you for choosing [facility]!    Our goal in the Urgent Care is to always provide outstanding medical care. You may receive a survey by mail or e-mail in the next week regarding your experience today. We would greatly appreciate you completing and returning the survey. Your feedback provides us with a way to recognize our staff who provide very good care, and it helps us learn how to improve when your experience was below our aspiration of excellence.      We appreciate you trusting us with your medical care. We hope you feel better soon. We will be happy to take care of you for all of your future medical needs.    Sincerely,    CHEKO Cota  Elevated Blood Pressure Reading  Please be aware your blood pressure was slightly elevated today -  Make sure to take your blood pressure  medicines, eat a low salt diet and recheck your blood pressure to make sure it is not getting too elevated ( greater than 160/100).  Also make sure to let your doctor know about the elevated reading.  You should recheck your blood pressure twice a day for the next 2 weeks while follow up with your PCP at your next visit regarding today's reading.  If you have any chest pain, shortness or breath, palpitations, headache, visual disturbances, ect, you should go to the ER.    In the meantime, we recommend you schedule an appointment with your PCP in the next 2-3 days for a recheck of your blood pressure.

## 2022-04-18 NOTE — PROGRESS NOTES
"Subjective:       Patient ID: Augustina Bailey is a 51 y.o. female.    Vitals:  height is 5' 2" (1.575 m) and weight is 64.4 kg (142 lb). Her tympanic temperature is 97.9 °F (36.6 °C). Her blood pressure is 161/96 (abnormal) and her pulse is 61. Her respiration is 18 and oxygen saturation is 96%.     Chief Complaint: Dysuria and Medication Refill (Amlodipine )    Pt complains of dysuria that started this morning, she has not taken another for symptoms.  Provider note begins below:  Pt reports she woke up this morning with dysuria, she denies hx of recurrent UTI. She did noticed blood in her urine this morning. Hx of partial hysterectomy. She has PCP at [facility]. She has not taken her amlodipine in months and requests a refill today. Notes a lot of stressors at this time,  is ill.     Dysuria   This is a new problem. The current episode started today. The problem occurs every urination. The problem has been unchanged. The quality of the pain is described as aching and burning. The pain is at a severity of 7/10. The pain is moderate. There has been no fever. Associated symptoms include frequency, hematuria and urgency. Pertinent negatives include no behavior changes, chills, discharge, flank pain, hesitancy, nausea, possible pregnancy, sweats, vomiting, weight loss, bubble bath use, constipation, rash or withholding. She has tried nothing for the symptoms.   Medication Refill  This is a new problem. The current episode started more than 1 month ago. Pertinent negatives include no abdominal pain, anorexia, arthralgias, change in bowel habit, chest pain, chills, congestion, coughing, diaphoresis, fatigue, fever, headaches, joint swelling, myalgias, nausea, neck pain, numbness, rash, sore throat, swollen glands, urinary symptoms, vertigo, visual change, vomiting or weakness. Nothing aggravates the symptoms. She has tried nothing for the symptoms.       Constitution: Negative for chills, sweating, " fatigue and fever.   HENT: Negative for congestion and sore throat.    Neck: Negative for neck pain.   Cardiovascular: Negative for chest pain.   Respiratory: Negative for cough.    Gastrointestinal: Negative for abdominal pain, nausea, vomiting and constipation.   Genitourinary: Positive for dysuria, frequency, urgency, hematuria and history of kidney stones. Negative for urine decreased, flank pain, bladder incontinence and bed wetting.   Musculoskeletal: Negative for joint pain, joint swelling, back pain and muscle ache.   Skin: Negative for rash.   Neurological: Negative for history of vertigo, headaches and numbness.       Objective:      Physical Exam   Constitutional: She is oriented to person, place, and time. She appears well-developed.  Non-toxic appearance. She does not appear ill. No distress. obesity  HENT:   Head: Normocephalic and atraumatic.   Ears:   Right Ear: External ear normal.   Left Ear: External ear normal.   Nose: Nose normal.   Mouth/Throat: Oropharynx is clear and moist.   Eyes: Conjunctivae, EOM and lids are normal. Pupils are equal, round, and reactive to light.   Neck: Trachea normal and phonation normal. Neck supple.   Abdominal: Bowel sounds are normal. Soft. There is no abdominal tenderness. There is no left CVA tenderness and no right CVA tenderness.   Musculoskeletal: Normal range of motion.         General: Normal range of motion.   Neurological: She is alert and oriented to person, place, and time.   Skin: Skin is warm, dry, intact and not diaphoretic.   Psychiatric: Her speech is normal and behavior is normal. Judgment and thought content normal.   Nursing note and vitals reviewed.        Assessment:       1. Acute cystitis with hematuria    2. Dysuria    3. Medication refill    4. Primary hypertension        Results for orders placed or performed in visit on 04/18/22   POCT Urinalysis, Dipstick, Automated, W/O Scope   Result Value Ref Range    POC Blood, Urine Positive (A)  Negative    POC Bilirubin, Urine Negative Negative    POC Urobilinogen, Urine Normal 0.1 - 1.1    POC Ketones, Urine Negative Negative    POC Protein, Urine Negative Negative    POC Nitrates, Urine Negative Negative    POC Glucose, Urine Negative Negative    pH, UA 6.5 5 - 8    POC Specific Gravity, Urine 1.020 1.003 - 1.029    POC Leukocytes, Urine Negative Negative      Plan:       Plans to see pcp this week, Ms. Srinivasan.  Bactrim BID x 5 days  Refilled amlodipine, started 5mg daily.   Stressed hematuria, will treat UTI and have her fu with pcp for re-eval, she agrees.    Discussed results/diagnosis/plan with patient in clinic. Strict precautions given to patient to monitor for worsening signs and symptoms. Advised to follow up with PCP or specialist.    Explained side effects of medications prescribed with patient and informed him/her to discontinue use if he/she has any side effects and to inform UC or PCP if this occurs. All questions answered. Strict ED verses clinic return precautions stressed and given in depth. Advised if symptoms worsens of fail to improve he/she should go to the Emergency Room. Discharge and follow-up instructions given verbally/printed with the patient who expressed understanding and willingness to comply with my recommendations. Patient voiced understanding and in agreement with current treatment plan. Patient exits the exam room in no acute distress. Conversant and engaged during discharge discussion, verbalized understanding.      Acute cystitis with hematuria  -     sulfamethoxazole-trimethoprim 800-160mg (BACTRIM DS) 800-160 mg Tab; Take 1 tablet by mouth 2 (two) times daily. for 5 days  Dispense: 10 tablet; Refill: 0    Dysuria  -     POCT Urinalysis, Dipstick, Automated, W/O Scope  -     sulfamethoxazole-trimethoprim 800-160mg (BACTRIM DS) 800-160 mg Tab; Take 1 tablet by mouth 2 (two) times daily. for 5 days  Dispense: 10 tablet; Refill: 0    Medication refill  -     amLODIPine  (NORVASC) 5 MG tablet; Take 1 tablet (5 mg total) by mouth once daily.  Dispense: 30 tablet; Refill: 0    Primary hypertension  -     amLODIPine (NORVASC) 5 MG tablet; Take 1 tablet (5 mg total) by mouth once daily.  Dispense: 30 tablet; Refill: 0           Medical Decision Making:   Clinical Tests:   Lab Tests: Ordered and Reviewed  Urgent Care Management:  After complete evaluation, including thorough history and physical exam, presentation is most consistent with uncomplicated UTI.  The patient has no severe flank pain or systemic symptoms to suggest pyelonephritis or sepsis. Physical exam is inconsistent with nephrolithiasis or acute intra-abdominal infection.  Patient is tolerating PO and is stable for D/C with PO antibiotics.  The patient was informed of findings, and recommended to follow-up with PCP for further management and urine re-check in 3-5 days.         Patient Instructions   General Discharge Instructions   PLEASE READ YOUR DISCHARGE INSTRUCTIONS ENTIRELY AS IT CONTAINS IMPORTANT INFORMATION.  If you were prescribed a narcotic or controlled medication, do not drive or operate heavy equipment or machinery while taking these medications.  If you were prescribed antibiotics, please take them to completion.  You must understand that you've received an Urgent Care treatment only and that you may be released before all your medical problems are known or treated. You, the patient, will arrange for follow up care as instructed.    OVER THE COUNTER RECOMMENDATIONS/SUGGESTIONS.    Make sure to stay well hydrated.    Use Nasal Saline to mechanically move any post nasal drip from your eustachian tube or from the back of your throat.    Use warm salt water gargles to ease your throat pain. Warm salt water gargles as needed for sore throat- 1/2 tsp salt to 1 cup warm water, gargle as desired.    Use an antihistamine such as Claritin, Zyrtec or Allegra to dry you out.    Use pseudoephedrine (behind the counter) to  decongest. Pseudoephedrine 30 mg up to 240 mg /day. It can raise your blood pressure and give you palpitations.    Use mucinex (guaifenesin) to break up mucous up to 2400mg/day to loosen any mucous.    The mucinex DM pill has a cough suppressant that can be sedating. It can be used at night to stop the tickle at the back of your throat.    You can use Mucinex D (it has guaifenesin and a high dose of pseudoephedrine) in the mornings to help decongest.    Use Afrin in each nare for no longer than 3 days, as it is addictive. It can also dry out your mucous membranes and cause elevated blood pressure. This is especially useful if you are flying.    Use Flonase 1-2 sprays/nostril per day. It is a local acting steroid nasal spray, if you develop a bloody nose, stop using the medication immediately.    Sometimes Nyquil at night is beneficial to help you get some rest, however it is sedating and it does have an antihistamine, and tylenol.    Honey is a natural cough suppressant that can be used.    Tylenol up to 4,000 mg a day is safe for short periods and can be used for body aches, pain, and fever. However in high doses and prolonged use it can cause liver irritation.    Ibuprofen is a non-steroidal anti-inflammatory that can be used for body aches, pain, and fever.However it can also cause stomach irritation if over used.     Follow up with your PCP or specialty clinic as instructed in the next 2-3 days if not improved or as needed. You can call [phone] to schedule an appointment with appropriate provider.      If you condition worsens, we recommend that you receive another evaluation at the emergency room immediately or contact your primary medical clinic's after hours call service to discuss your concerns.      Please return here or go to the Emergency Department for any concerns or worsening condition.   You can also call [phone] to schedule an appointment with the appropriate provider.    Please return  here or go to the Emergency Department for any concerns or worsening of condition.    Thank you for choosing [facility]!    Our goal in the Urgent Care is to always provide outstanding medical care. You may receive a survey by mail or e-mail in the next week regarding your experience today. We would greatly appreciate you completing and returning the survey. Your feedback provides us with a way to recognize our staff who provide very good care, and it helps us learn how to improve when your experience was below our aspiration of excellence.      We appreciate you trusting us with your medical care. We hope you feel better soon. We will be happy to take care of you for all of your future medical needs.    Sincerely,    CHEKO Cota  Elevated Blood Pressure Reading  Please be aware your blood pressure was slightly elevated today -  Make sure to take your blood pressure medicines, eat a low salt diet and recheck your blood pressure to make sure it is not getting too elevated ( greater than 160/100).  Also make sure to let your doctor know about the elevated reading.  You should recheck your blood pressure twice a day for the next 2 weeks while follow up with your PCP at your next visit regarding today's reading.  If you have any chest pain, shortness or breath, palpitations, headache, visual disturbances, ect, you should go to the ER.    In the meantime, we recommend you schedule an appointment with your PCP in the next 2-3 days for a recheck of your blood pressure.

## 2023-05-26 ENCOUNTER — OFFICE VISIT (OUTPATIENT)
Dept: URGENT CARE | Facility: CLINIC | Age: 53
End: 2023-05-26
Payer: MEDICARE

## 2023-05-26 VITALS
HEART RATE: 74 BPM | HEIGHT: 62 IN | SYSTOLIC BLOOD PRESSURE: 147 MMHG | TEMPERATURE: 98 F | DIASTOLIC BLOOD PRESSURE: 95 MMHG | BODY MASS INDEX: 45.45 KG/M2 | OXYGEN SATURATION: 96 % | WEIGHT: 247 LBS | RESPIRATION RATE: 14 BRPM

## 2023-05-26 DIAGNOSIS — R05.9 COUGH, UNSPECIFIED TYPE: ICD-10-CM

## 2023-05-26 DIAGNOSIS — J06.9 VIRAL URI: Primary | ICD-10-CM

## 2023-05-26 DIAGNOSIS — J34.3 HYPERTROPHY OF NASAL TURBINATES: ICD-10-CM

## 2023-05-26 DIAGNOSIS — R51.9 ACUTE NONINTRACTABLE HEADACHE, UNSPECIFIED HEADACHE TYPE: ICD-10-CM

## 2023-05-26 DIAGNOSIS — J31.0 RHINITIS, UNSPECIFIED TYPE: ICD-10-CM

## 2023-05-26 PROBLEM — C50.919 BREAST CANCER: Status: ACTIVE | Noted: 2018-09-22

## 2023-05-26 PROBLEM — N62 MACROMASTIA: Status: ACTIVE | Noted: 2023-05-26

## 2023-05-26 PROBLEM — I10 ESSENTIAL HYPERTENSION: Status: ACTIVE | Noted: 2023-05-26

## 2023-05-26 PROBLEM — H91.90 HEARING LOSS: Status: ACTIVE | Noted: 2023-05-26

## 2023-05-26 PROBLEM — Z17.0 MALIGNANT NEOPLASM OF UPPER-OUTER QUADRANT OF LEFT BREAST IN FEMALE, ESTROGEN RECEPTOR POSITIVE: Status: ACTIVE | Noted: 2023-05-26

## 2023-05-26 PROBLEM — C50.412 MALIGNANT NEOPLASM OF UPPER-OUTER QUADRANT OF LEFT BREAST IN FEMALE, ESTROGEN RECEPTOR POSITIVE: Status: ACTIVE | Noted: 2023-05-26

## 2023-05-26 PROBLEM — H54.3 LOW VISION, BOTH EYES: Status: ACTIVE | Noted: 2023-05-26

## 2023-05-26 PROBLEM — Z85.3 PERSONAL HISTORY OF MALIGNANT NEOPLASM OF BREAST: Status: ACTIVE | Noted: 2023-05-26

## 2023-05-26 PROBLEM — K75.81 NASH (NONALCOHOLIC STEATOHEPATITIS): Status: ACTIVE | Noted: 2020-09-25

## 2023-05-26 PROBLEM — Z87.19 HISTORY OF GASTROINTESTINAL DISEASE: Status: ACTIVE | Noted: 2023-05-26

## 2023-05-26 PROBLEM — Z98.890 S/P BREAST RECONSTRUCTION, LEFT: Status: ACTIVE | Noted: 2023-05-26

## 2023-05-26 PROBLEM — J30.2 SEASONAL ALLERGIES: Status: ACTIVE | Noted: 2023-05-26

## 2023-05-26 LAB
CTP QC/QA: YES
SARS-COV-2 AG RESP QL IA.RAPID: NEGATIVE

## 2023-05-26 PROCEDURE — 87811 SARS-COV-2 COVID19 W/OPTIC: CPT | Mod: QW,S$GLB,, | Performed by: NURSE PRACTITIONER

## 2023-05-26 PROCEDURE — 87811 SARS CORONAVIRUS 2 ANTIGEN POCT, MANUAL READ: ICD-10-PCS | Mod: QW,S$GLB,, | Performed by: NURSE PRACTITIONER

## 2023-05-26 PROCEDURE — 99213 PR OFFICE/OUTPT VISIT, EST, LEVL III, 20-29 MIN: ICD-10-PCS | Mod: S$GLB,,, | Performed by: NURSE PRACTITIONER

## 2023-05-26 PROCEDURE — 99213 OFFICE O/P EST LOW 20 MIN: CPT | Mod: S$GLB,,, | Performed by: NURSE PRACTITIONER

## 2023-05-26 RX ORDER — FLUTICASONE PROPIONATE 50 MCG
2 SPRAY, SUSPENSION (ML) NASAL DAILY PRN
Qty: 15.8 ML | Refills: 0 | Status: SHIPPED | OUTPATIENT
Start: 2023-05-26

## 2023-05-26 RX ORDER — BENZONATATE 100 MG/1
100 CAPSULE ORAL 3 TIMES DAILY PRN
Qty: 30 CAPSULE | Refills: 0 | Status: SHIPPED | OUTPATIENT
Start: 2023-05-26 | End: 2023-06-05

## 2023-05-26 RX ORDER — AMLODIPINE BESYLATE 10 MG/1
1 TABLET ORAL DAILY
COMMUNITY

## 2023-05-26 NOTE — PROGRESS NOTES
"Subjective:      Patient ID: Augustina Bailey is a 53 y.o. female.    Vitals:  height is 5' 2" (1.575 m) and weight is 112 kg (247 lb). Her oral temperature is 98.3 °F (36.8 °C). Her blood pressure is 147/95 (abnormal) and her pulse is 74. Her respiration is 14 and oxygen saturation is 96%.     Chief Complaint: Cough    Pt is here today for headache and coughing X 1 week  Pt is taking Dayquil    53 year old female presents to clinic with complaints of cough and headache x 1 week, treating with dayquil. Denies history of receiving covid vaccines.    Cough  This is a new problem. The current episode started in the past 7 days. The problem has been unchanged. The problem occurs constantly. The cough is Non-productive. Associated symptoms include headaches, nasal congestion and postnasal drip. Pertinent negatives include no chills, ear pain, fever, myalgias or wheezing. Nothing aggravates the symptoms. Treatments tried: Dayquil.     Constitution: Negative for chills, sweating, fatigue and fever.   HENT:  Positive for congestion and postnasal drip. Negative for ear pain, sinus pain and sinus pressure.    Respiratory:  Positive for cough. Negative for chest tightness, wheezing and asthma.    Gastrointestinal:  Negative for abdominal pain, nausea and vomiting.   Musculoskeletal:  Negative for muscle ache.   Allergic/Immunologic: Negative for asthma.   Neurological:  Positive for headaches.    Objective:     Physical Exam   Constitutional: She is oriented to person, place, and time. She appears well-developed. She is cooperative.  Non-toxic appearance. She does not appear ill. No distress.   HENT:   Head: Normocephalic and atraumatic.   Ears:   Right Ear: Hearing, tympanic membrane, external ear and ear canal normal.   Left Ear: Hearing, tympanic membrane, external ear and ear canal normal.   Nose: Mucosal edema and rhinorrhea present. No nasal deformity. No epistaxis. Right sinus exhibits no maxillary sinus tenderness and no " frontal sinus tenderness. Left sinus exhibits no maxillary sinus tenderness and no frontal sinus tenderness.   Mouth/Throat: Uvula is midline, oropharynx is clear and moist and mucous membranes are normal. No trismus in the jaw. Normal dentition. No uvula swelling. No oropharyngeal exudate, posterior oropharyngeal edema or posterior oropharyngeal erythema.   Eyes: Conjunctivae and lids are normal. No scleral icterus.   Neck: Trachea normal and phonation normal. Neck supple. No edema present. No erythema present. No neck rigidity present.   Cardiovascular: Normal rate, regular rhythm and normal heart sounds.   Pulmonary/Chest: Effort normal and breath sounds normal. No respiratory distress. She has no decreased breath sounds. She has no rhonchi.   Scattered crackles via bronchial region with cough         Comments: Scattered crackles via bronchial region with cough    Abdominal: Normal appearance.   Musculoskeletal: Normal range of motion.         General: No deformity. Normal range of motion.   Neurological: She is alert and oriented to person, place, and time. She exhibits normal muscle tone. Coordination normal.   Skin: Skin is warm, dry, intact, not diaphoretic and not pale.   Psychiatric: Her speech is normal and behavior is normal. Judgment and thought content normal.   Nursing note and vitals reviewed.    Assessment:     1. Viral URI    2. Cough, unspecified type    3. Acute nonintractable headache, unspecified headache type    4. Rhinitis, unspecified type    5. Hypertrophy of nasal turbinates      Results for orders placed or performed in visit on 05/26/23   SARS Coronavirus 2 Antigen, POCT Manual Read   Result Value Ref Range    SARS Coronavirus 2 Antigen Negative Negative     Acceptable Yes       Plan:       Viral URI    Cough, unspecified type  -     SARS Coronavirus 2 Antigen, POCT Manual Read  -     benzonatate (TESSALON) 100 MG capsule; Take 1 capsule (100 mg total) by mouth 3 (three)  times daily as needed for Cough.  Dispense: 30 capsule; Refill: 0    Acute nonintractable headache, unspecified headache type    Rhinitis, unspecified type  -     fluticasone propionate (FLONASE) 50 mcg/actuation nasal spray; 2 sprays (100 mcg total) by Each Nostril route daily as needed for Rhinitis.  Dispense: 15.8 mL; Refill: 0    Hypertrophy of nasal turbinates  -     fluticasone propionate (FLONASE) 50 mcg/actuation nasal spray; 2 sprays (100 mcg total) by Each Nostril route daily as needed for Rhinitis.  Dispense: 15.8 mL; Refill: 0      Patient Instructions   Please drink plenty of fluids.  Please get plenty of rest.  Please return here or go to the Emergency Department for any concerns or worsening of condition.  It is ok to take over the counter plain Allegra or Claritin or Zyrtec.   If you do have Hypertension or palpitations, it is safe to take Coricidin HBP for relief of sinus symptoms.  We recommend you take over the counter Flonase (Fluticasone) or another nasally inhaled steroid unless you are already taking one.  Nasal irrigation with a saline spray or Netti Pot like device per their directions is also recommended.  If not allergic, please take over the counter Tylenol (Acetaminophen) and/or Motrin (Ibuprofen) as directed for control of pain and/or fever.  Please follow up with your primary care doctor or specialist as needed.    If you  smoke, please stop smoking.

## 2023-05-26 NOTE — PATIENT INSTRUCTIONS
Please drink plenty of fluids.  Please get plenty of rest.  Please return here or go to the Emergency Department for any concerns or worsening of condition.  It is ok to take over the counter plain Allegra or Claritin or Zyrtec.   If you do have Hypertension or palpitations, it is safe to take Coricidin HBP for relief of sinus symptoms.  We recommend you take over the counter Flonase (Fluticasone) or another nasally inhaled steroid unless you are already taking one.  Nasal irrigation with a saline spray or Netti Pot like device per their directions is also recommended.  If not allergic, please take over the counter Tylenol (Acetaminophen) and/or Motrin (Ibuprofen) as directed for control of pain and/or fever.  Please follow up with your primary care doctor or specialist as needed.    If you  smoke, please stop smoking.

## 2023-10-06 ENCOUNTER — TELEPHONE (OUTPATIENT)
Dept: SURGERY | Facility: CLINIC | Age: 53
End: 2023-10-06
Payer: MEDICARE

## 2023-10-06 NOTE — TELEPHONE ENCOUNTER
Referral received from [facility]/DR. TORRI RANGEL.    Left  to schedule an appointment with Dr. Fisher.

## 2023-10-06 NOTE — TELEPHONE ENCOUNTER
----- Message from Benjamin Srinivasan sent at 10/6/2023  1:49 PM CDT -----  Type:  Patient Returning Call    Who Called: Self    Who Left Message for Patient: Liat Chirinos    Does the patient know what this is regarding?:Yes    Would the patient rather a call back or a response via [facility]? Call    Best Call Back Number:[phone]    Additional Information:

## 2023-10-16 ENCOUNTER — ANESTHESIA EVENT (OUTPATIENT)
Dept: SURGERY | Facility: HOSPITAL | Age: 53
End: 2023-10-16
Payer: MEDICARE

## 2023-10-16 ENCOUNTER — TELEPHONE (OUTPATIENT)
Dept: PREADMISSION TESTING | Facility: HOSPITAL | Age: 53
End: 2023-10-16
Payer: MEDICARE

## 2023-10-16 ENCOUNTER — OFFICE VISIT (OUTPATIENT)
Dept: SURGERY | Facility: CLINIC | Age: 53
End: 2023-10-16
Payer: MEDICARE

## 2023-10-16 VITALS
HEART RATE: 50 BPM | BODY MASS INDEX: 37.22 KG/M2 | WEIGHT: 202.25 LBS | OXYGEN SATURATION: 98 % | HEIGHT: 62 IN | SYSTOLIC BLOOD PRESSURE: 140 MMHG | DIASTOLIC BLOOD PRESSURE: 93 MMHG

## 2023-10-16 DIAGNOSIS — Z17.0 MALIGNANT NEOPLASM OF UPPER-OUTER QUADRANT OF LEFT BREAST IN FEMALE, ESTROGEN RECEPTOR POSITIVE: Primary | ICD-10-CM

## 2023-10-16 DIAGNOSIS — C50.412 MALIGNANT NEOPLASM OF UPPER-OUTER QUADRANT OF LEFT BREAST IN FEMALE, ESTROGEN RECEPTOR POSITIVE: Primary | ICD-10-CM

## 2023-10-16 PROCEDURE — 99204 OFFICE O/P NEW MOD 45 MIN: CPT | Mod: S$GLB,,, | Performed by: SURGERY

## 2023-10-16 PROCEDURE — 99204 PR OFFICE/OUTPT VISIT, NEW, LEVL IV, 45-59 MIN: ICD-10-PCS | Mod: S$GLB,,, | Performed by: SURGERY

## 2023-10-16 RX ORDER — SODIUM CHLORIDE 9 MG/ML
INJECTION, SOLUTION INTRAVENOUS CONTINUOUS
Status: CANCELLED | OUTPATIENT
Start: 2023-10-16

## 2023-10-16 RX ORDER — MUPIROCIN 20 MG/G
OINTMENT TOPICAL
Status: CANCELLED | OUTPATIENT
Start: 2023-10-16

## 2023-10-16 NOTE — PROGRESS NOTES
History & Physical    SUBJECTIVE:     History of Present Illness:  Patient is a 53 y.o. female presents with new or recurrent breast cancer of the left breast. She will need chemo and is here for port placement.     Chief Complaint   Patient presents with    Consult       Review of patient's allergies indicates:  No Known Allergies    Current Outpatient Medications   Medication Sig Dispense Refill    amLODIPine (NORVASC) 10 MG tablet Take 10 mg by mouth once daily.      amLODIPine (NORVASC) 10 MG tablet Take 1 tablet by mouth once daily.      exemestane (AROMASIN) 25 mg tablet Take 25 mg by mouth once daily.      fluticasone propionate (FLONASE) 50 mcg/actuation nasal spray 2 sprays (100 mcg total) by Each Nostril route daily as needed for Rhinitis. 15.8 mL 0    meloxicam (MOBIC) 7.5 MG tablet Take 1 tablet (7.5 mg total) by mouth once daily. 12 tablet 0    ondansetron (ZOFRAN) 4 MG tablet Take 2 tablets (8 mg total) by mouth every 12 (twelve) hours as needed. 12 tablet 0    tamsulosin (FLOMAX) 0.4 mg Cap Take 1 capsule (0.4 mg total) by mouth once daily. (Patient not taking: Reported on 2021) 10 capsule 0     No current facility-administered medications for this visit.       Past Medical History:   Diagnosis Date    Anemia     Cancer     left breast    Encounter for blood transfusion     Heart murmur     Hypertension     Interstitial cystitis      Past Surgical History:   Procedure Laterality Date    BREAST SURGERY      biopsy     SECTION      x3    HERNIA REPAIR      HYSTERECTOMY      still have ovaries    INJECTION FOR SENTINEL NODE IDENTIFICATION Left 2019    Procedure: INJECTION, FOR SENTINEL NODE IDENTIFICATION;  Surgeon: Andre Fisher MD;  Location: [facility] OR;  Service: General;  Laterality: Left;  VERIFIED WITH DOUGLAS IN [email or web address] MED---    INSERTION OF BREAST TISSUE EXPANDER Left 2019    Procedure: INSERTION, TISSUE EXPANDER, BREAST;  Surgeon: Sagar Odonnell MD;  Location: [facility] OR;   "Service: Plastics;  Laterality: Left;    INSERTION OF TUNNELED CENTRAL VENOUS CATHETER (CVC) WITH SUBCUTANEOUS PORT N/A 9/17/2018    Procedure: INSERTION, PORT-A-CATH;  Surgeon: Andre Gastelum MD;  Location: [facility] OR;  Service: General;  Laterality: N/A;  RN PRE OP 9-10-18-----NEED H/P    SENTINEL LYMPH NODE BIOPSY Left 2/6/2019    Procedure: BIOPSY, LYMPH NODE, SENTINEL;  Surgeon: Andre Gastelum MD;  Location: [facility] OR;  Service: General;  Laterality: Left;    SIMPLE MASTECTOMY Left 2/6/2019    Procedure: MASTECTOMY, SIMPLE;  Surgeon: Andre Gastelum MD;  Location: [facility] OR;  Service: General;  Laterality: Left;  joint case with Dr. Blas GASTELUM TO INJECT IN OR PER THEODORE @ 11:47AM ON 2-1-19     Family History   Problem Relation Age of Onset    Heart failure Mother     Breast cancer Neg Hx     Colon cancer Neg Hx     Ovarian cancer Neg Hx      Social History     Tobacco Use    Smoking status: Never    Smokeless tobacco: Never   Substance Use Topics    Alcohol use: No    Drug use: No        Review of Systems:  Review of Systems   Constitutional: Negative.    HENT: Negative.     Eyes: Negative.    Respiratory: Negative.     Cardiovascular: Negative.    Gastrointestinal: Negative.    Endocrine: Negative.    Musculoskeletal: Negative.    Skin: Negative.    Allergic/Immunologic: Negative.    Neurological: Negative.    Hematological: Negative.    Psychiatric/Behavioral: Negative.     All other systems reviewed and are negative.      OBJECTIVE:     Vital Signs (Most Recent)  Pulse: (!) 50 (10/16/23 1327)  BP: (!) 140/93 (10/16/23 1327)  SpO2: 98 % (10/16/23 1327)  5' 2" (1.575 m)  91.7 kg (202 lb 4.4 oz)     Physical Exam:  Physical Exam  Vitals reviewed.   Constitutional:       Appearance: She is well-developed.   HENT:      Head: Normocephalic and atraumatic.      Right Ear: External ear normal.      Left Ear: External ear normal.      Nose: Nose normal.   Eyes:      Conjunctiva/sclera: Conjunctivae normal.    "   Pupils: Pupils are equal, round, and reactive to light.   Cardiovascular:      Rate and Rhythm: Normal rate and regular rhythm.      Heart sounds: Normal heart sounds.   Pulmonary:      Effort: Pulmonary effort is normal.      Breath sounds: Normal breath sounds.   Abdominal:      General: Bowel sounds are normal.      Palpations: Abdomen is soft.   Musculoskeletal:         General: Normal range of motion.      Cervical back: Normal range of motion and neck supple.   Skin:     General: Skin is warm and dry.   Neurological:      Mental Status: She is alert and oriented to person, place, and time.      Deep Tendon Reflexes: Reflexes are normal and symmetric.   Psychiatric:         Behavior: Behavior normal.         Thought Content: Thought content normal.         Laboratory  none    Diagnostic Results:  none    ASSESSMENT/PLAN:     Left breast cancer     PLAN:Plan     I discussed her diagnosis and I will take her to the OR for port placement.  With the risks explained

## 2023-10-16 NOTE — H&P (VIEW-ONLY)
History & Physical    SUBJECTIVE:     History of Present Illness:  Patient is a 53 y.o. female presents with new or recurrent breast cancer of the left breast. She will need chemo and is here for port placement.     Chief Complaint   Patient presents with    Consult       Review of patient's allergies indicates:  No Known Allergies    Current Outpatient Medications   Medication Sig Dispense Refill    amLODIPine (NORVASC) 10 MG tablet Take 10 mg by mouth once daily.      amLODIPine (NORVASC) 10 MG tablet Take 1 tablet by mouth once daily.      exemestane (AROMASIN) 25 mg tablet Take 25 mg by mouth once daily.      fluticasone propionate (FLONASE) 50 mcg/actuation nasal spray 2 sprays (100 mcg total) by Each Nostril route daily as needed for Rhinitis. 15.8 mL 0    meloxicam (MOBIC) 7.5 MG tablet Take 1 tablet (7.5 mg total) by mouth once daily. 12 tablet 0    ondansetron (ZOFRAN) 4 MG tablet Take 2 tablets (8 mg total) by mouth every 12 (twelve) hours as needed. 12 tablet 0    tamsulosin (FLOMAX) 0.4 mg Cap Take 1 capsule (0.4 mg total) by mouth once daily. (Patient not taking: Reported on 2021) 10 capsule 0     No current facility-administered medications for this visit.       Past Medical History:   Diagnosis Date    Anemia     Cancer     left breast    Encounter for blood transfusion     Heart murmur     Hypertension     Interstitial cystitis      Past Surgical History:   Procedure Laterality Date    BREAST SURGERY      biopsy     SECTION      x3    HERNIA REPAIR      HYSTERECTOMY      still have ovaries    INJECTION FOR SENTINEL NODE IDENTIFICATION Left 2019    Procedure: INJECTION, FOR SENTINEL NODE IDENTIFICATION;  Surgeon: Andre Fisher MD;  Location: [facility] OR;  Service: General;  Laterality: Left;  VERIFIED WITH DOUGLAS IN [employer] MED---    INSERTION OF BREAST TISSUE EXPANDER Left 2019    Procedure: INSERTION, TISSUE EXPANDER, BREAST;  Surgeon: Sagar Odonnell MD;  Location: [facility] OR;   "Service: Plastics;  Laterality: Left;    INSERTION OF TUNNELED CENTRAL VENOUS CATHETER (CVC) WITH SUBCUTANEOUS PORT N/A 9/17/2018    Procedure: INSERTION, PORT-A-CATH;  Surgeon: Andre Gastelum MD;  Location: [facility] OR;  Service: General;  Laterality: N/A;  RN PRE OP 9-10-18-----NEED H/P    SENTINEL LYMPH NODE BIOPSY Left 2/6/2019    Procedure: BIOPSY, LYMPH NODE, SENTINEL;  Surgeon: Andre Gastelum MD;  Location: [facility] OR;  Service: General;  Laterality: Left;    SIMPLE MASTECTOMY Left 2/6/2019    Procedure: MASTECTOMY, SIMPLE;  Surgeon: Andre Gastelum MD;  Location: [facility] OR;  Service: General;  Laterality: Left;  joint case with Dr. Blas GASTELUM TO INJECT IN OR PER THEODORE @ 11:47AM ON 2-1-19     Family History   Problem Relation Age of Onset    Heart failure Mother     Breast cancer Neg Hx     Colon cancer Neg Hx     Ovarian cancer Neg Hx      Social History     Tobacco Use    Smoking status: Never    Smokeless tobacco: Never   Substance Use Topics    Alcohol use: No    Drug use: No        Review of Systems:  Review of Systems   Constitutional: Negative.    HENT: Negative.     Eyes: Negative.    Respiratory: Negative.     Cardiovascular: Negative.    Gastrointestinal: Negative.    Endocrine: Negative.    Musculoskeletal: Negative.    Skin: Negative.    Allergic/Immunologic: Negative.    Neurological: Negative.    Hematological: Negative.    Psychiatric/Behavioral: Negative.     All other systems reviewed and are negative.      OBJECTIVE:     Vital Signs (Most Recent)  Pulse: (!) 50 (10/16/23 1327)  BP: (!) 140/93 (10/16/23 1327)  SpO2: 98 % (10/16/23 1327)  5' 2" (1.575 m)  91.7 kg (202 lb 4.4 oz)     Physical Exam:  Physical Exam  Vitals reviewed.   Constitutional:       Appearance: She is well-developed.   HENT:      Head: Normocephalic and atraumatic.      Right Ear: External ear normal.      Left Ear: External ear normal.      Nose: Nose normal.   Eyes:      Conjunctiva/sclera: Conjunctivae normal.    "   Pupils: Pupils are equal, round, and reactive to light.   Cardiovascular:      Rate and Rhythm: Normal rate and regular rhythm.      Heart sounds: Normal heart sounds.   Pulmonary:      Effort: Pulmonary effort is normal.      Breath sounds: Normal breath sounds.   Abdominal:      General: Bowel sounds are normal.      Palpations: Abdomen is soft.   Musculoskeletal:         General: Normal range of motion.      Cervical back: Normal range of motion and neck supple.   Skin:     General: Skin is warm and dry.   Neurological:      Mental Status: She is alert and oriented to person, place, and time.      Deep Tendon Reflexes: Reflexes are normal and symmetric.   Psychiatric:         Behavior: Behavior normal.         Thought Content: Thought content normal.         Laboratory  none    Diagnostic Results:  none    ASSESSMENT/PLAN:     Left breast cancer     PLAN:Plan     I discussed her diagnosis and I will take her to the OR for port placement.  With the risks explained

## 2023-10-23 ENCOUNTER — HOSPITAL ENCOUNTER (OUTPATIENT)
Dept: PREADMISSION TESTING | Facility: HOSPITAL | Age: 53
Discharge: HOME | End: 2023-10-23
Attending: SURGERY
Payer: MEDICARE

## 2023-10-23 VITALS
WEIGHT: 199.94 LBS | HEIGHT: 62 IN | DIASTOLIC BLOOD PRESSURE: 89 MMHG | RESPIRATION RATE: 18 BRPM | OXYGEN SATURATION: 98 % | BODY MASS INDEX: 36.79 KG/M2 | HEART RATE: 48 BPM | SYSTOLIC BLOOD PRESSURE: 160 MMHG | TEMPERATURE: 98 F

## 2023-10-23 DIAGNOSIS — Z17.0 MALIGNANT NEOPLASM OF UPPER-OUTER QUADRANT OF LEFT BREAST IN FEMALE, ESTROGEN RECEPTOR POSITIVE: ICD-10-CM

## 2023-10-23 DIAGNOSIS — C50.412 MALIGNANT NEOPLASM OF UPPER-OUTER QUADRANT OF LEFT BREAST IN FEMALE, ESTROGEN RECEPTOR POSITIVE: ICD-10-CM

## 2023-10-23 DIAGNOSIS — Z01.818 PREOP TESTING: Primary | ICD-10-CM

## 2023-10-23 LAB
ALBUMIN SERPL BCP-MCNC: 3.9 G/DL (ref 3.5–5.2)
ALP SERPL-CCNC: 73 U/L (ref 55–135)
ALT SERPL W/O P-5'-P-CCNC: 56 U/L (ref 10–44)
ANION GAP SERPL CALC-SCNC: 9 MMOL/L (ref 8–16)
AST SERPL-CCNC: 48 U/L (ref 10–40)
BASOPHILS # BLD AUTO: 0.03 K/UL (ref 0–0.2)
BASOPHILS NFR BLD: 0.6 % (ref 0–1.9)
BILIRUB SERPL-MCNC: 0.8 MG/DL (ref 0.1–1)
BUN SERPL-MCNC: 11 MG/DL (ref 6–20)
CALCIUM SERPL-MCNC: 10.2 MG/DL (ref 8.7–10.5)
CHLORIDE SERPL-SCNC: 109 MMOL/L (ref 95–110)
CO2 SERPL-SCNC: 28 MMOL/L (ref 23–29)
CREAT SERPL-MCNC: 0.8 MG/DL (ref 0.5–1.4)
DIFFERENTIAL METHOD: NORMAL
EOSINOPHIL # BLD AUTO: 0.1 K/UL (ref 0–0.5)
EOSINOPHIL NFR BLD: 1.5 % (ref 0–8)
ERYTHROCYTE [DISTWIDTH] IN BLOOD BY AUTOMATED COUNT: 12.6 % (ref 11.5–14.5)
EST. GFR  (NO RACE VARIABLE): >60 ML/MIN/1.73 M^2
GLUCOSE SERPL-MCNC: 82 MG/DL (ref 70–110)
HCT VFR BLD AUTO: 43.5 % (ref 37–48.5)
HGB BLD-MCNC: 14.4 G/DL (ref 12–16)
IMM GRANULOCYTES # BLD AUTO: 0.02 K/UL (ref 0–0.04)
IMM GRANULOCYTES NFR BLD AUTO: 0.4 % (ref 0–0.5)
LYMPHOCYTES # BLD AUTO: 1.5 K/UL (ref 1–4.8)
LYMPHOCYTES NFR BLD: 27.6 % (ref 18–48)
MCH RBC QN AUTO: 29.5 PG (ref 27–31)
MCHC RBC AUTO-ENTMCNC: 33.1 G/DL (ref 32–36)
MCV RBC AUTO: 89 FL (ref 82–98)
MONOCYTES # BLD AUTO: 0.6 K/UL (ref 0.3–1)
MONOCYTES NFR BLD: 10.6 % (ref 4–15)
NEUTROPHILS # BLD AUTO: 3.2 K/UL (ref 1.8–7.7)
NEUTROPHILS NFR BLD: 59.3 % (ref 38–73)
NRBC BLD-RTO: 0 /100 WBC
PLATELET # BLD AUTO: 157 K/UL (ref 150–450)
PMV BLD AUTO: 11.3 FL (ref 9.2–12.9)
POTASSIUM SERPL-SCNC: 4.3 MMOL/L (ref 3.5–5.1)
PROT SERPL-MCNC: 7.6 G/DL (ref 6–8.4)
RBC # BLD AUTO: 4.88 M/UL (ref 4–5.4)
SODIUM SERPL-SCNC: 146 MMOL/L (ref 136–145)
WBC # BLD AUTO: 5.4 K/UL (ref 3.9–12.7)

## 2023-10-23 PROCEDURE — 85025 COMPLETE CBC W/AUTO DIFF WBC: CPT | Performed by: SURGERY

## 2023-10-23 PROCEDURE — 80053 COMPREHEN METABOLIC PANEL: CPT | Performed by: SURGERY

## 2023-10-23 NOTE — DISCHARGE INSTRUCTIONS
YOUR PROCEDURE WILL BE AT [facility] at [address] [place] [address]                  Before 7 AM, enter through the Emergency Entrance..   After 7 AM enter through the Main Entrance.                 Report to the Same Day Surgery Registration Desk in the hallway.(Just beside the Same Day Surgery Unit)      Your procedure  is scheduled for ____10/25/2023______.    Call [phone] between 2pm and 5pm on _10/24/2023______to find out your arrival time for the day of surgery.    You may have two visitors.  No children under 12 years old.     You will be going to the Same Day Surgery Unit on the 2nd floor of the hospital.    Important instructions:  Do not eat anything after midnight.  You may have plain water, non carbonated.  You may also have Gatorade or Powerade after midnight.    Stop all fluids 2 hours before your surgery.    It is okay to brush your teeth.  Do not have gum, candy or mints.    SEE MEDICATION SHEET.   TAKE MEDICATIONS AS DIRECTED WITH SIPS OF WATER.      Do not take any diabetic medication on the morning of surgery unless instructed to do so by your doctor or pre op nurse.      All GLP-1 weekly diabetic/weight loss medications must not be taken for one week before your surgery, or your surgery could be canceled.      STOP taking Aspirin, Ibuprofen,  Advil, Motrin, Mobic(meloxicam), Aleve (naproxen), Fish oil, and Vitamin E for at least 7 days before your surgery.     You may take Tylenol if needed which is not a blood thinner.    Please shower the night before and the morning of your surgery.      Follow any Prep Instructions given by your surgeon.    Use Chlorhexidine soap as instructed by your pre op nurse.   Please place clean linens on your bed the night before surgery. Please wear fresh clean clothing after each shower.    No shaving of procedural area at least 4-5 days before surgery due to increased risk of skin irritation and/or possible  infection.    Female patients may be asked for a urine specimen on the morning of the surgery.  Please check with your nurse before using the restroom.    Contact lenses and removable denture work may not be worn during your procedure.    You may wear deodorant only. If you are having breast surgery, do not wear deodorant on the operative side.    Do not wear powder, body lotion, perfume/cologne or make-up, oils, creams, rubs or sprays.    Do not wear any jewelry or have any metal on your body.    You will be asked to remove any dentures or partials for the procedure.    If you are going home on the same day of surgery, you must arrange for a family member or a friend to drive you home.  Public transportation is prohibited.  You will not be able to drive home if you were given anesthesia or sedation.    Patients who want to have their Post-op prescriptions filled from our in-house [facility], bring a Credit/Debit Card or cash with you. A co-pay may be required.  The pharmacy closes at 5:30 pm.    Wear loose fitting clothes allowing for bandages.    Please leave money and valuables home.      You may bring your cell phone.    Call the doctor if fever or illness should occur before your surgery.    Call [phone] to contact us here if needed.                            CLOTHES ON DAY OF SURGERY    SHOULDER surgery:  you must have a very oversized shirt.  Very, Very large.  You will probably have a large sling on with your arm strapped to your chest.  You will not be able to put the arm of the operated shoulder into a sleeve.  You can put the arm of the un-operated shoulder into the sleeve, but the shirt will need to be draped over the operated shoulder.       ARM or HAND surgery:  make sure that your sleeves are large and loose enough to pass over large dressings or cast.      BREAST or UNDERARM surgery:  wear a loose, button down shirt so that you can dress without raising your arms over your head.    ABDOMINAL  surgery:  wear loose, comfortable clothing.  Nothing tight around the abdomen.  NO JEANS    PENIS or SCROTAL surgery:  loose comfortable clothing.  Large sweat pants, pajama pants or a robe.  ABSOLUTELY NO JEANS      LEG or FOOT surgery:  wear large loose pants that are able to pass over any large dressings or casts.  You could also wear loose shorts or a skirt.

## 2023-10-23 NOTE — ANESTHESIA PREPROCEDURE EVALUATION
10/23/2023  Augustina Bailey is a 53 y.o., female scheduled for [id number]-CATH (Left: Chest) on 10/25/2023.    PMH: recurrent breast cancer (to start chemo soon), HTN, GERD, interstitial cystitis, [place]    Prev airway: easy mask, grade 11a view with staples 2    Past Surgical History:   Procedure Laterality Date    BREAST SURGERY      biopsy     SECTION      x3    HERNIA REPAIR      HYSTERECTOMY      still have ovaries    INJECTION FOR SENTINEL NODE IDENTIFICATION Left 2019    Procedure: INJECTION, FOR SENTINEL NODE IDENTIFICATION;  Surgeon: Andre Fisher MD;  Location: [facility] OR;  Service: General;  Laterality: Left;  VERIFIED WITH DOUGLAS IN [employer] MED---    INSERTION OF BREAST TISSUE EXPANDER Left 2019    Procedure: INSERTION, TISSUE EXPANDER, BREAST;  Surgeon: Sagar Odonnell MD;  Location: [facility] OR;  Service: Plastics;  Laterality: Left;    INSERTION OF TUNNELED CENTRAL VENOUS CATHETER (CVC) WITH SUBCUTANEOUS PORT N/A 2018    Procedure: INSERTION, PORT-A-CATH;  Surgeon: Andre Fisher MD;  Location: [facility] OR;  Service: General;  Laterality: N/A;  RN PRE OP 9-10-18-----NEED H/P    SENTINEL LYMPH NODE BIOPSY Left 2019    Procedure: BIOPSY, LYMPH NODE, SENTINEL;  Surgeon: Andre Fisher MD;  Location: [facility] OR;  Service: General;  Laterality: Left;    SIMPLE MASTECTOMY Left 2019    Procedure: MASTECTOMY, SIMPLE;  Surgeon: Andre Fisher MD;  Location: [facility] OR;  Service: General;  Laterality: Left;  joint case with Dr. Blas FISHER TO INJECT IN OR PER THEODORE @ 11:47AM ON 19           Pre-op Assessment    I have reviewed the Patient Summary Reports.     I have reviewed the Nursing Notes. I have reviewed the NPO Status.   I have reviewed the Medications.     Review of Systems  Anesthesia Hx:  No problems with previous Anesthesia  Denies Family Hx of  Anesthesia complications.   Denies Personal Hx of Anesthesia complications.   Social:  Non-Smoker, No Alcohol Use    Hematology/Oncology:  Hematology Normal      Current/Recent Cancer. Breast left   EENT/Dental:EENT/Dental Normal   Cardiovascular:   Exercise tolerance: good Hypertension  Functional Capacity good / => 4 METS    Pulmonary:  Pulmonary Normal    Renal/:  Renal/ Normal     Hepatic/GI:   Liver Disease, H/o Gastric sleeve 7/23   Musculoskeletal:  Musculoskeletal Normal    Neurological:  Neurology Normal    Endocrine:  Endocrine Normal  Obesity / BMI > 30  Dermatological:  Skin Normal    Psych:  Psychiatric Normal           Physical Exam  General: Cooperative, Alert and Oriented    Airway:  Mallampati: III   Mouth Opening: Normal  TM Distance: Normal  Tongue: Normal  Neck ROM: Normal ROM    Dental:  Intact        Anesthesia Plan  Type of Anesthesia, risks & benefits discussed:    Anesthesia Type: Gen Natural Airway, MAC  Intra-op Monitoring Plan: Standard ASA Monitors  Post Op Pain Control Plan: multimodal analgesia and IV/PO Opioids PRN  Induction:  IV  Informed Consent: Informed consent signed with the Patient and all parties understand the risks and agree with anesthesia plan.  All questions answered.   ASA Score: 3  Day of Surgery Review of History & Physical: H&P Update referred to the surgeon/provider.    Ready For Surgery From Anesthesia Perspective.     .

## 2023-10-24 ENCOUNTER — TELEPHONE (OUTPATIENT)
Dept: SURGERY | Facility: HOSPITAL | Age: 53
End: 2023-10-24
Payer: MEDICARE

## 2023-10-25 ENCOUNTER — ANESTHESIA (OUTPATIENT)
Dept: SURGERY | Facility: HOSPITAL | Age: 53
End: 2023-10-25
Payer: MEDICARE

## 2023-10-25 ENCOUNTER — HOSPITAL ENCOUNTER (OUTPATIENT)
Facility: HOSPITAL | Age: 53
Discharge: HOME | End: 2023-10-25
Attending: SURGERY | Admitting: SURGERY
Payer: MEDICARE

## 2023-10-25 VITALS
OXYGEN SATURATION: 96 % | BODY MASS INDEX: 36.57 KG/M2 | SYSTOLIC BLOOD PRESSURE: 110 MMHG | DIASTOLIC BLOOD PRESSURE: 70 MMHG | WEIGHT: 199.94 LBS | RESPIRATION RATE: 18 BRPM | TEMPERATURE: 98 F | HEART RATE: 61 BPM

## 2023-10-25 DIAGNOSIS — Z17.0 MALIGNANT NEOPLASM OF UPPER-OUTER QUADRANT OF LEFT BREAST IN FEMALE, ESTROGEN RECEPTOR POSITIVE: Primary | ICD-10-CM

## 2023-10-25 DIAGNOSIS — C50.412 MALIGNANT NEOPLASM OF UPPER-OUTER QUADRANT OF LEFT BREAST IN FEMALE, ESTROGEN RECEPTOR POSITIVE: Primary | ICD-10-CM

## 2023-10-25 LAB — POCT GLUCOSE: 78 MG/DL (ref 70–110)

## 2023-10-25 PROCEDURE — 37000009 HC ANESTHESIA EA ADD 15 MINS: Performed by: SURGERY

## 2023-10-25 PROCEDURE — 37000008 HC ANESTHESIA 1ST 15 MINUTES: Performed by: SURGERY

## 2023-10-25 PROCEDURE — 71000016 HC POSTOP RECOV ADDL HR: Performed by: SURGERY

## 2023-10-25 PROCEDURE — 25000003 PHARM REV CODE 250: Performed by: SURGERY

## 2023-10-25 PROCEDURE — 36561 PR INSERT TUNNELED CV CATH WITH PORT: ICD-10-PCS | Mod: LT,,, | Performed by: SURGERY

## 2023-10-25 PROCEDURE — D9220A PRA ANESTHESIA: Mod: CRNA,,, | Performed by: NURSE ANESTHETIST, CERTIFIED REGISTERED

## 2023-10-25 PROCEDURE — 71000015 HC POSTOP RECOV 1ST HR: Performed by: SURGERY

## 2023-10-25 PROCEDURE — 71000033 HC RECOVERY, INTIAL HOUR: Performed by: SURGERY

## 2023-10-25 PROCEDURE — 63600175 PHARM REV CODE 636 W HCPCS: Performed by: SURGERY

## 2023-10-25 PROCEDURE — D9220A PRA ANESTHESIA: ICD-10-PCS | Mod: CRNA,,, | Performed by: NURSE ANESTHETIST, CERTIFIED REGISTERED

## 2023-10-25 PROCEDURE — 36561 INSERT TUNNELED CV CATH: CPT | Mod: LT,,, | Performed by: SURGERY

## 2023-10-25 PROCEDURE — 36000706: Performed by: SURGERY

## 2023-10-25 PROCEDURE — C1788 PORT, INDWELLING, IMP: HCPCS | Performed by: SURGERY

## 2023-10-25 PROCEDURE — 25000003 PHARM REV CODE 250: Performed by: NURSE ANESTHETIST, CERTIFIED REGISTERED

## 2023-10-25 PROCEDURE — D9220A PRA ANESTHESIA: ICD-10-PCS | Mod: ANES,,, | Performed by: STUDENT IN AN ORGANIZED HEALTH CARE EDUCATION/TRAINING PROGRAM

## 2023-10-25 PROCEDURE — 36000707: Performed by: SURGERY

## 2023-10-25 PROCEDURE — 63600175 PHARM REV CODE 636 W HCPCS: Performed by: NURSE ANESTHETIST, CERTIFIED REGISTERED

## 2023-10-25 PROCEDURE — 77001 FLUOROGUIDE FOR VEIN DEVICE: CPT | Mod: 26,,, | Performed by: SURGERY

## 2023-10-25 PROCEDURE — 77001 CHG FLUOROGUIDE CNTRL VEN ACCESS,PLACE,REPLACE,REMOVE: ICD-10-PCS | Mod: 26,,, | Performed by: SURGERY

## 2023-10-25 PROCEDURE — 82962 GLUCOSE BLOOD TEST: CPT | Performed by: SURGERY

## 2023-10-25 PROCEDURE — D9220A PRA ANESTHESIA: Mod: ANES,,, | Performed by: STUDENT IN AN ORGANIZED HEALTH CARE EDUCATION/TRAINING PROGRAM

## 2023-10-25 DEVICE — PORT POWER 8FR NO SUT PLUG: Type: IMPLANTABLE DEVICE | Site: CHEST | Status: FUNCTIONAL

## 2023-10-25 RX ORDER — HEPARIN SODIUM 1000 [USP'U]/ML
INJECTION, SOLUTION INTRAVENOUS; SUBCUTANEOUS
Status: DISCONTINUED | OUTPATIENT
Start: 2023-10-25 | End: 2023-10-25 | Stop reason: HOSPADM

## 2023-10-25 RX ORDER — LIDOCAINE HYDROCHLORIDE 10 MG/ML
INJECTION, SOLUTION EPIDURAL; INFILTRATION; INTRACAUDAL; PERINEURAL
Status: DISCONTINUED | OUTPATIENT
Start: 2023-10-25 | End: 2023-10-25 | Stop reason: HOSPADM

## 2023-10-25 RX ORDER — SODIUM CHLORIDE 0.9 % (FLUSH) 0.9 %
10 SYRINGE (ML) INJECTION DAILY PRN
Status: DISCONTINUED | OUTPATIENT
Start: 2023-10-25 | End: 2023-10-25 | Stop reason: HOSPADM

## 2023-10-25 RX ORDER — ACETAMINOPHEN 10 MG/ML
1000 INJECTION, SOLUTION INTRAVENOUS ONCE
Status: CANCELLED | OUTPATIENT
Start: 2023-10-25 | End: 2023-10-25

## 2023-10-25 RX ORDER — PROCHLORPERAZINE EDISYLATE 5 MG/ML
5 INJECTION INTRAMUSCULAR; INTRAVENOUS EVERY 30 MIN PRN
Status: DISCONTINUED | OUTPATIENT
Start: 2023-10-25 | End: 2023-10-25 | Stop reason: HOSPADM

## 2023-10-25 RX ORDER — MIDAZOLAM HYDROCHLORIDE 1 MG/ML
INJECTION INTRAMUSCULAR; INTRAVENOUS
Status: DISCONTINUED | OUTPATIENT
Start: 2023-10-25 | End: 2023-10-25

## 2023-10-25 RX ORDER — SODIUM CHLORIDE 9 MG/ML
INJECTION, SOLUTION INTRAVENOUS CONTINUOUS
Status: CANCELLED | OUTPATIENT
Start: 2023-10-25

## 2023-10-25 RX ORDER — LIDOCAINE HYDROCHLORIDE 20 MG/ML
INJECTION INTRAVENOUS
Status: DISCONTINUED | OUTPATIENT
Start: 2023-10-25 | End: 2023-10-25

## 2023-10-25 RX ORDER — SODIUM CHLORIDE 9 MG/ML
INJECTION, SOLUTION INTRAVENOUS CONTINUOUS
Status: DISCONTINUED | OUTPATIENT
Start: 2023-10-25 | End: 2023-10-25 | Stop reason: HOSPADM

## 2023-10-25 RX ORDER — HYDROCODONE BITARTRATE AND ACETAMINOPHEN 5; 325 MG/1; MG/1
1 TABLET ORAL EVERY 6 HOURS PRN
Qty: 30 TABLET | Refills: 0 | Status: SHIPPED | OUTPATIENT
Start: 2023-10-25

## 2023-10-25 RX ORDER — OXYCODONE HYDROCHLORIDE 5 MG/1
5 TABLET ORAL
Status: DISCONTINUED | OUTPATIENT
Start: 2023-10-25 | End: 2023-10-25 | Stop reason: HOSPADM

## 2023-10-25 RX ORDER — BUPIVACAINE HYDROCHLORIDE 2.5 MG/ML
INJECTION, SOLUTION EPIDURAL; INFILTRATION; INTRACAUDAL
Status: DISCONTINUED | OUTPATIENT
Start: 2023-10-25 | End: 2023-10-25 | Stop reason: HOSPADM

## 2023-10-25 RX ORDER — MUPIROCIN 20 MG/G
OINTMENT TOPICAL
Status: DISCONTINUED | OUTPATIENT
Start: 2023-10-25 | End: 2023-10-25 | Stop reason: HOSPADM

## 2023-10-25 RX ORDER — FENTANYL CITRATE 50 UG/ML
INJECTION, SOLUTION INTRAMUSCULAR; INTRAVENOUS
Status: DISCONTINUED | OUTPATIENT
Start: 2023-10-25 | End: 2023-10-25

## 2023-10-25 RX ORDER — PROPOFOL 10 MG/ML
VIAL (ML) INTRAVENOUS CONTINUOUS PRN
Status: DISCONTINUED | OUTPATIENT
Start: 2023-10-25 | End: 2023-10-25

## 2023-10-25 RX ORDER — HYDROMORPHONE HYDROCHLORIDE 2 MG/ML
0.2 INJECTION, SOLUTION INTRAMUSCULAR; INTRAVENOUS; SUBCUTANEOUS EVERY 5 MIN PRN
Status: DISCONTINUED | OUTPATIENT
Start: 2023-10-25 | End: 2023-10-25 | Stop reason: HOSPADM

## 2023-10-25 RX ORDER — CEFAZOLIN SODIUM 2 G/50ML
2 SOLUTION INTRAVENOUS
Status: COMPLETED | OUTPATIENT
Start: 2023-10-25 | End: 2023-10-25

## 2023-10-25 RX ORDER — MORPHINE SULFATE 4 MG/ML
3 INJECTION, SOLUTION INTRAMUSCULAR; INTRAVENOUS
Status: CANCELLED | OUTPATIENT
Start: 2023-10-25

## 2023-10-25 RX ADMIN — LIDOCAINE HYDROCHLORIDE 100 MG: 20 INJECTION, SOLUTION INTRAVENOUS at 07:10

## 2023-10-25 RX ADMIN — MUPIROCIN 1 G: 20 OINTMENT TOPICAL at 06:10

## 2023-10-25 RX ADMIN — SODIUM CHLORIDE, SODIUM LACTATE, POTASSIUM CHLORIDE, AND CALCIUM CHLORIDE: .6; .31; .03; .02 INJECTION, SOLUTION INTRAVENOUS at 07:10

## 2023-10-25 RX ADMIN — CEFAZOLIN SODIUM 2 G: 2 SOLUTION INTRAVENOUS at 07:10

## 2023-10-25 RX ADMIN — PROPOFOL 75 MCG/KG/MIN: 10 INJECTION, EMULSION INTRAVENOUS at 07:10

## 2023-10-25 RX ADMIN — FENTANYL CITRATE 50 MCG: 50 INJECTION, SOLUTION INTRAMUSCULAR; INTRAVENOUS at 07:10

## 2023-10-25 RX ADMIN — MIDAZOLAM HYDROCHLORIDE 1 MG: 1 INJECTION INTRAMUSCULAR; INTRAVENOUS at 07:10

## 2023-10-25 NOTE — ANESTHESIA POSTPROCEDURE EVALUATION
Anesthesia Post Evaluation    Patient: Augustina Bailey    Procedure(s) Performed: Procedure(s) (LRB):  [id number] (Left)    Final Anesthesia Type: general      Patient location during evaluation: PACU  Patient participation: Yes- Able to Participate  Level of consciousness: awake and alert, oriented and awake  Post-procedure vital signs: reviewed and stable  Pain management: adequate  Airway patency: patent  HARPAL mitigation strategies: Multimodal analgesia  PONV status at discharge: No PONV  Anesthetic complications: no      Cardiovascular status: blood pressure returned to baseline and hemodynamically stable  Respiratory status: unassisted and spontaneous ventilation  Hydration status: euvolemic  Follow-up not needed.          Vitals Value Taken Time   /69 10/25/23 0817   Temp 36.6 °C (97.8 °F) 10/25/23 0814   Pulse 55 10/25/23 0829   Resp 9 10/25/23 0829   SpO2 94 % 10/25/23 0829   Vitals shown include unvalidated device data.      No case tracking events are documented in the log.      Pain/Rajindre Score: Rajinder Score: 10 (10/25/2023  8:10 AM)

## 2023-10-25 NOTE — TRANSFER OF CARE
Anesthesia Transfer of Care Note    Patient: Augustina Bailey    Procedure(s) Performed: Procedure(s) (LRB):  [id number] (Left)    Patient location: PACU    Anesthesia Type: MAC    Transport from OR: Transported from OR on room air with adequate spontaneous ventilation    Post pain: adequate analgesia    Post assessment: no apparent anesthetic complications and tolerated procedure well    Post vital signs: stable    Level of consciousness: awake    Nausea/Vomiting: no nausea/vomiting    Complications: none    Transfer of care protocol was followed      Last vitals:   Visit Vitals  /71   Pulse 72   Temp 36.6 °C (97.8 °F)   Resp 15   Wt 90.7 kg (199 lb 15 oz)   SpO2 96%   Breastfeeding No   BMI 36.57 kg/m²

## 2023-10-25 NOTE — BRIEF OP NOTE
[facility] - Surgery  Brief Operative Note    Surgery Date: 10/25/2023     Surgeon(s) and Role:     * Andre Fisher MD - Primary    Assisting Surgeon: None    Pre-op Diagnosis:  Malignant neoplasm of upper-outer quadrant of left breast in female, estrogen receptor positive [C50.412, Z17.0]    Post-op Diagnosis:  Post-Op Diagnosis Codes:     * Malignant neoplasm of upper-outer quadrant of left breast in female, estrogen receptor positive [C50.412, Z17.0]    Procedure(s) (LRB):  [id number]-CATH (Left)    Anesthesia: Local MAC    Operative Findings:  Normal anatomy    Estimated Blood Loss: * No values recorded between 10/25/2023  7:36 AM and 10/25/2023  8:02 AM *         Specimens:   Specimen (24h ago, onward)      None              Discharge Note    OUTCOME: Patient tolerated treatment/procedure well without complication and is now ready for discharge.    DISPOSITION: Home or Self Care    FINAL DIAGNOSIS:  Breast cancer of upper-outer quadrant of left female breast    FOLLOWUP: In clinic    DISCHARGE INSTRUCTIONS:    Discharge Procedure Orders   Diet general     Remove dressing in 24 hours     Call MD for:  temperature >100.4     Call MD for:  persistent nausea and vomiting     Call MD for:  severe uncontrolled pain     Call MD for:  difficulty breathing, headache or visual disturbances     Call MD for:  redness, tenderness, or signs of infection (pain, swelling, redness, odor or green/yellow discharge around incision site)     Call MD for:  hives     Shower on day dressing removed (No bath)

## 2023-10-25 NOTE — DISCHARGE INSTRUCTIONS
[facility]  Office # [phone]    Discharge Instructions for Same Day Surgery     Call the office for and appointment if one has not already been made.     Diet: Drink plenty of fluids the first 48 hours and you may resume your   usual diet.     Activity: No heavy lifting (over 10 pounds), pushing or pulling until your   post op visit. Your doctor's office may have told you to limit your lifting to less weight, or even no weight.  Be sure to follow those instructions.    Note: You may ride in a car and you may drive when comfortable.     Do not drive, drink alcohol, or sign legal documents for 24 hours, or if taking narcotic pain medication.    Dressings: Remove the dressing 24 hours after surgery. You may shower  24 hours after surgery and you may wash your hair.     If you have steri strips ( appears to be strips of white tape) on   your incision, leave them on. In 5-7 days they will begin to fall off.    Medical: Call the doctor for any of the following problems: fever above 101,   severe pain, bleeding, or abdominal distention (swelling).   If constipated you may take any stool softener you choose.     Occasionally small areas of skin numbness or an unpleasant skin sensation can result. Also, you may find that your incision is swollen and tender for a few days.  Some redness around sutures and staples is a normal reaction, but if the discomfort persists or worsens, call you doctor.

## 2023-10-25 NOTE — OP NOTE
Port-A-Cath Insertion        DATE OF PROCEDURE: 10/25/2023     PREOPERATIVE DIAGNOSIS:  Breast cancer     POSTOPERATIVE DIAGNOSIS:  Same     PROCEDURE PERFORMED: Port-A-Cath insertion via cephalic vein cut down, left.     SURGEON: Andre Fisher M.D.     ANESTHESIA: General.     DESCRIPTION OF OPERATION: The patient was brought to the Operating Room, placed  on operating room table in supine position. Under adequate general anesthesia,  prepped and draped around her left shoulder in the usual sterile fashion.   Incision was made in deltopectoral groove, deepened to expose her cephalic vein.  Proximal and distal control was obtained. Small venotomy was performed.   Catheter was inserted to the cavoatrial junction and verified with fluoroscopy.   This was attached to a port, which was fixed to the patient's chest wall and   flushed. The wound was then closed in layers with absorbable suture.   Steri-Strips were applied as well as bandage.     ESTIMATED BLOOD LOSS: Minimal.

## 2023-10-25 NOTE — PLAN OF CARE
Care complete. Pt verbalized understanding of discharge instructions and readiness to go home.  Now waiting for  to return and transport home.

## 2024-08-22 ENCOUNTER — HOSPITAL ENCOUNTER (EMERGENCY)
Facility: HOSPITAL | Age: 54
Discharge: HOME | End: 2024-08-22
Attending: STUDENT IN AN ORGANIZED HEALTH CARE EDUCATION/TRAINING PROGRAM
Payer: MEDICARE

## 2024-08-22 VITALS
TEMPERATURE: 98 F | RESPIRATION RATE: 14 BRPM | WEIGHT: 165 LBS | OXYGEN SATURATION: 97 % | BODY MASS INDEX: 29.23 KG/M2 | HEART RATE: 62 BPM | SYSTOLIC BLOOD PRESSURE: 165 MMHG | DIASTOLIC BLOOD PRESSURE: 98 MMHG | HEIGHT: 63 IN

## 2024-08-22 DIAGNOSIS — R30.0 DYSURIA: Primary | ICD-10-CM

## 2024-08-22 DIAGNOSIS — R35.0 URINARY FREQUENCY: ICD-10-CM

## 2024-08-22 LAB
BILIRUBIN, POC UA: NEGATIVE
BLOOD, POC UA: ABNORMAL
CLARITY, UA POC: CLEAR
COLOR, UA POC: YELLOW
GLUCOSE, POC UA: NEGATIVE
KETONES, POC UA: ABNORMAL
LEUKOCYTE EST, POC UA: NEGATIVE
NITRITE, POC UA: NEGATIVE
PH UR STRIP: 5.5 [PH]
PROTEIN, POC UA: ABNORMAL
SPECIFIC GRAVITY, POC UA: >=1.03
UROBILINOGEN, POC UA: 1 E.U./DL

## 2024-08-22 PROCEDURE — 99283 EMERGENCY DEPT VISIT LOW MDM: CPT | Mod: ER

## 2024-08-22 PROCEDURE — 87086 URINE CULTURE/COLONY COUNT: CPT | Performed by: STUDENT IN AN ORGANIZED HEALTH CARE EDUCATION/TRAINING PROGRAM

## 2024-08-22 RX ORDER — NITROFURANTOIN 25; 75 MG/1; MG/1
100 CAPSULE ORAL 2 TIMES DAILY
Qty: 10 CAPSULE | Refills: 0 | Status: SHIPPED | OUTPATIENT
Start: 2024-08-22 | End: 2024-08-27

## 2024-08-23 NOTE — DISCHARGE INSTRUCTIONS
Thank you for coming to our Emergency Department today. It is important to remember that some problems are difficult to diagnose and may not be found during your first visit. Be sure to follow up with your primary care doctor and review any labs/imaging that was performed with them. If you do not have a primary care doctor, you may contact the one listed on your discharge paperwork or you may also call the [facility] Appointment Desk at [phone] to schedule an appointment with one.     All medications may potentially have side effects and it is impossible to predict which medications may give you side effects. If you feel that you are having a negative effect of any medication you should immediately stop taking them and seek medical attention.    Return to the ER with any questions/concerns, new/concerning symptoms, worsening or failure to improve. Do not drive or make any important decisions for 24 hours if you have received any pain medications, sedatives or mood altering drugs during your ER visit.

## 2024-08-23 NOTE — ED PROVIDER NOTES
Encounter Date: 2024       History     Chief Complaint   Patient presents with    Urinary Frequency     Reports urinary frequency and urgency since yesterday. Denies abd pain. Denies dysuria/hematuria.      54 y.o. female who has a past medical history of Anemia, Cancer, Encounter for blood transfusion, Heart murmur, Hypertension, and Interstitial cystitis. presents to the emergency department due to dysuria and urinary frequency as well as intermittent episodes of suprapubic abdominal pain for the past 2 days.  Patient reports he is time she urinates she has tingling/burning sensation.  Denies any hematuria.  Denies any flank pain nausea or vomiting.  Denies any fevers or chills.  Patient reports having similar symptoms when she had recurrent UTIs in the past.    The history is provided by the patient.     Review of patient's allergies indicates:  No Known Allergies  Past Medical History:   Diagnosis Date    Anemia     Cancer     left breast    Encounter for blood transfusion     Heart murmur     Hypertension     Interstitial cystitis      Past Surgical History:   Procedure Laterality Date    BREAST SURGERY      biopsy     SECTION      x3    HERNIA REPAIR      HYSTERECTOMY      still have ovaries    INJECTION FOR SENTINEL NODE IDENTIFICATION Left 2019    Procedure: INJECTION, FOR SENTINEL NODE IDENTIFICATION;  Surgeon: Andre Fisher MD;  Location: [facility] OR;  Service: General;  Laterality: Left;  VERIFIED WITH DOUGLAS IN [facility]---    INSERTION OF BREAST TISSUE EXPANDER Left 2019    Procedure: INSERTION, TISSUE EXPANDER, BREAST;  Surgeon: Sagar Odonnell MD;  Location: [facility] OR;  Service: Plastics;  Laterality: Left;    INSERTION OF TUNNELED CENTRAL VENOUS CATHETER (CVC) WITH SUBCUTANEOUS PORT N/A 2018    Procedure: INSERTION, PORT-A-CATH;  Surgeon: Andre Fisher MD;  Location: [facility] OR;  Service: General;  Laterality: N/A;  RN PRE OP 9-10-18-----NEED H/P    INSERTION OF TUNNELED  CENTRAL VENOUS CATHETER (CVC) WITH SUBCUTANEOUS PORT Left 10/25/2023    Procedure: [id number];  Surgeon: Andre Gastelum MD;  Location: [facility] OR;  Service: General;  Laterality: Left;  RN PREOP 10/23/2023    SENTINEL LYMPH NODE BIOPSY Left 2/6/2019    Procedure: BIOPSY, LYMPH NODE, SENTINEL;  Surgeon: Andre Gastelum MD;  Location: [facility] OR;  Service: General;  Laterality: Left;    SIMPLE MASTECTOMY Left 2/6/2019    Procedure: MASTECTOMY, SIMPLE;  Surgeon: Andre Gsatelum MD;  Location: [facility] OR;  Service: General;  Laterality: Left;  joint case with Dr. Blas GASTELUM TO INJECT IN OR PER [facility] @ 11:47AM ON 2-1-19     Family History   Problem Relation Name Age of Onset    Heart failure Mother      Breast cancer Neg Hx      Colon cancer Neg Hx      Ovarian cancer Neg Hx       Social History     Tobacco Use    Smoking status: Never    Smokeless tobacco: Never   Substance Use Topics    Alcohol use: No    Drug use: No     Review of Systems   Constitutional:  Negative for chills and fever.   HENT:  Negative for congestion and rhinorrhea.    Eyes:  Negative for pain.   Respiratory:  Negative for cough and shortness of breath.    Cardiovascular:  Negative for chest pain and leg swelling.   Gastrointestinal:  Negative for abdominal pain, nausea and vomiting.   Genitourinary:  Positive for dysuria and frequency. Negative for flank pain, hematuria, pelvic pain, vaginal bleeding and vaginal discharge.   Musculoskeletal:  Negative for joint swelling and neck pain.   Skin:  Negative for rash.   Neurological:  Negative for weakness and headaches.       Physical Exam     Initial Vitals [08/22/24 2241]   BP Pulse Resp Temp SpO2   (!) 165/98 62 14 98 °F (36.7 °C) 97 %      MAP       --         Physical Exam    Nursing note and vitals reviewed.  Constitutional: She is not diaphoretic. No distress.   HENT:   Head: Normocephalic and atraumatic.   Eyes: EOM are normal.   Pulmonary/Chest: Breath sounds normal. No  respiratory distress.   Abdominal: Abdomen is soft. Bowel sounds are normal. She exhibits no distension. There is no abdominal tenderness.   No right CVA tenderness.  No left CVA tenderness.   Musculoskeletal:         General: No tenderness. Normal range of motion.     Neurological: She is alert.   Skin: Skin is warm. Capillary refill takes less than 2 seconds.   Psychiatric: Her behavior is normal.         ED Course   Procedures  Labs Reviewed   POCT URINALYSIS W/O SCOPE - Abnormal       Result Value    Glucose, UA Negative      Bilirubin, UA Negative      Ketones, UA 1+ (*)     Spec Grav UA >=1.030 (*)     Blood, UA 3+ (*)     PH, UA 5.5      Protein, UA Trace (*)     Urobilinogen, UA 1.0      Nitrite, UA Negative      Leukocytes, UA Negative      Color, UA POC Yellow      Clarity, UA, POC Clear     CULTURE, URINE   POCT URINALYSIS W/O SCOPE          Imaging Results    None          Medications - No data to display  Medical Decision Making:   Initial Assessment:   54 y.o. female who has a past medical history of Anemia, Cancer, Encounter for blood transfusion, Heart murmur, Hypertension, and Interstitial cystitis. presents to the emergency department due to dysuria and urinary frequency as well as intermittent episodes of suprapubic abdominal pain for the past 2 days.  Patient in no acute distress, vitals notable for mildly elevated blood pressure otherwise unremarkable.  No CVA tenderness on exam no suprapubic tenderness.  Given patient's report of urinary frequency and dysuria that is similar to prior urinary tract infections UA was obtained that did not show any overt signs of infection but given patient asymptomatic will treat with Macrobid.  Urine sent for cultures.  Strict return precautions and anticipatory guidance discussed.  Differential Diagnosis:   Differential Diagnosis includes, but is not limited to:  UTI/pyelonephritis, kidney stone, urinary retention,       Clinical Tests:   Lab Tests: Ordered and  Reviewed             ED Course as of 08/22/24 2324   Thu Aug 22, 2024   2308 POCT URINALYSIS W/O SCOPE(!)  Pt is currently stable for discharge. I see no indication of an emergent process beyond that addressed during our encounter but have duly counseled the patient/family regarding the need for prompt follow-up as well as the indications that should prompt immediate return to the emergency room should new or worrisome developments occur. I discussed the ED work up and diagnostic findings with the patient/family. The patient/family has been provided with verbal and printed direction regarding our final diagnosis(es) as well as instructions regarding use of OTC and/or Rx medications intended to manage the patient's aforementioned conditions. The patient/family verbalized an understanding. The patient/family is asked if there are any questions or concerns. We discuss the case, until all issues are addressed to the patient/family's satisfaction. Patient/family understands and is agreeable to the plan.  [AS]      ED Course User Index  [AS] Jennifer Lopez MD          Medical Decision Making  Amount and/or Complexity of Data Reviewed  Labs:  Decision-making details documented in ED Course.    Risk  Prescription drug management.           Clinical Impression:   Final diagnoses:  [R35.0] Urinary frequency  [R30.0] Dysuria (Primary)          ED Disposition Condition    Discharge Stable          ED Prescriptions       Medication Sig Dispense Start Date End Date Auth. Provider    nitrofurantoin, macrocrystal-monohydrate, (MACROBID) 100 MG capsule Take 1 capsule (100 mg total) by mouth 2 (two) times daily. for 5 days 10 capsule 8/22/2024 8/27/2024 Jennifer Lopez MD          Follow-up Information       Follow up With Specialties Details Why Contact Info    Chapis Srinivasan NP Family Medicine Schedule an appointment as soon as possible for a visit  for reassesment [address] [place] [address]  [place] [address] [phone]       [facility] ED Emergency Medicine  If symptoms worsen [address]  [facility] [address] [phone]            DISCLAIMER: This note was prepared with [employer] voice recognition transcription software. Garbled syntax, mangled pronouns, and other bizarre constructions may be attributed to that software system.     Jennifer Lopez MD  08/22/24 2384

## 2024-08-25 LAB — BACTERIA UR CULT: NORMAL

## (undated) DEVICE — GLOVE BIOGEL 7.5

## (undated) DEVICE — DRESSING ANTIMICROBIAL 1 INCH

## (undated) DEVICE — SUT 3-0 VICRYL / SH (J416)

## (undated) DEVICE — STAPLER SKIN ROTATING HEAD

## (undated) DEVICE — DRESSING TRANS 8X12 TEGADERM

## (undated) DEVICE — SOL NACL 0.9% INJ 250ML BG

## (undated) DEVICE — SEE L#120831

## (undated) DEVICE — NDL 18GA X1 1/2 REG BEVEL

## (undated) DEVICE — COVER OVERHEAD SURG LT BLUE

## (undated) DEVICE — SYS CLSR DERMABOND PRINEO 22CM

## (undated) DEVICE — SHEET THYROID W/ISO-BAC

## (undated) DEVICE — STOPCOCK DISCOFIX 3 WAY

## (undated) DEVICE — SOL NS 1000CC

## (undated) DEVICE — TRAY FOLEY 16FR INFECTION CONT

## (undated) DEVICE — SEE MEDLINE ITEM 107746

## (undated) DEVICE — SET CYSTO IRRIGATION UNIV SPIK

## (undated) DEVICE — SUPPORT ULNA NERVE PROTECTOR

## (undated) DEVICE — BLADE SURG CARBON STEEL SZ11

## (undated) DEVICE — CLOSURE SKIN STERI STRIP 1/2X4

## (undated) DEVICE — SUT PROLENE 3-0 SH DA 36 BL

## (undated) DEVICE — Device

## (undated) DEVICE — SUT CTD VICRYL 3-0 CR/SH

## (undated) DEVICE — SEE MEDLINE ITEM 146292

## (undated) DEVICE — DRAPE STERI INSTRUMENT 1018

## (undated) DEVICE — SUT SILK 2-0 PS 18IN BLACK

## (undated) DEVICE — RETRACTOR RADIALUX LIGHTED

## (undated) DEVICE — BLADE PEAK PLASMA

## (undated) DEVICE — PAD K-THERMIA 24IN X 60IN

## (undated) DEVICE — SYR 10CC LUER LOCK

## (undated) DEVICE — POSITIONER HEAD DONUT 9IN FOAM

## (undated) DEVICE — SOL SOD CHLORIDE 0.9% 10ML

## (undated) DEVICE — GLOVE SURG BIOGEL LATEX SZ 7.5

## (undated) DEVICE — NDL HYPO REG 25G X 1 1/2

## (undated) DEVICE — STRIP MEDI WND CLSR 1/2X4IN

## (undated) DEVICE — SOL IRR SOD CHL .9% POUR

## (undated) DEVICE — SEE MEDLINE ITEM 146417

## (undated) DEVICE — ELECTRODE BLADE TEFLON 6

## (undated) DEVICE — SOL WATER STRL IRR 1000ML

## (undated) DEVICE — ELECTRODE REM PLYHSV RETURN 9

## (undated) DEVICE — SEE MEDLINE ITEM 152531

## (undated) DEVICE — BLANKET LOWER BODY 55.9X40.2IN

## (undated) DEVICE — SUT STRATAFIX SPRL PS-2 3-0

## (undated) DEVICE — SET DECANTER MEDICHOICE

## (undated) DEVICE — SUT 2/0 36IN COATED VICRYL

## (undated) DEVICE — WIRE GUIDE .035 150CM.

## (undated) DEVICE — SYS LABLNG CORECT MED 4 FLG

## (undated) DEVICE — DRAPE C-ARM FOR MOBILE XRAY

## (undated) DEVICE — GLOVE BIOGEL ECLIPSE SZ 6

## (undated) DEVICE — DRESSING TRANS 4X4 TEGADERM

## (undated) DEVICE — SEE MEDLINE ITEM 152622

## (undated) DEVICE — STAPLER SKIN REGULAR

## (undated) DEVICE — SUT MONOCRYL 3-0 PS-1

## (undated) DEVICE — APPLICATOR CHLORAPREP ORN 26ML

## (undated) DEVICE — SUT CTD VICRYL BR CR/SH VIL

## (undated) DEVICE — SUT VICRYL PLUS 4-0 P3 18IN

## (undated) DEVICE — CANISTER SUCTION 2 LTR

## (undated) DEVICE — SYR 3CC LUER LOC

## (undated) DEVICE — TOWEL OR DISP STRL BLUE 4/PK

## (undated) DEVICE — EVACUATOR WOUND BULB 100CC

## (undated) DEVICE — APPLIER CLIP LIAGCLIP 9.375IN

## (undated) DEVICE — GLOVE BIOGEL ECLIPSE SZ 7.5

## (undated) DEVICE — SUT 2/0 18IN COATED VICRYL

## (undated) DEVICE — UNDERGLOVES BIOGEL PI SIZE 7.5

## (undated) DEVICE — SUT CTD VICRYL 3-0 UND SUTU

## (undated) DEVICE — SUT 2-0 12-18IN SILK

## (undated) DEVICE — SYR LUER LOCK 1CC

## (undated) DEVICE — UNDERGLOVES BIOGEL PI SIZE 8

## (undated) DEVICE — SEE MEDLINE ITEM 157117

## (undated) DEVICE — TIP YANKAUERS BULB NO VENT

## (undated) DEVICE — NDL SPINAL 20GX3.5 HUB

## (undated) DEVICE — STAPLER SKIN PROXIMATE WIDE

## (undated) DEVICE — ADHESIVE DERMABOND ADVANCED

## (undated) DEVICE — SEE MEDLINE ITEM 157110

## (undated) DEVICE — DRAIN SIL RND HUBLSS 19F TRCR

## (undated) DEVICE — BRA CLASSIC COMFORT 44 BLACK

## (undated) DEVICE — SUT VICRYL PLUS 3-0 SH 18IN

## (undated) DEVICE — PAD ABD 8X10 STERILE

## (undated) DEVICE — CONTAINER SPECIMEN STRL 4OZ

## (undated) DEVICE — SYR 50CC LL

## (undated) DEVICE — SEE MEDLINE ITEM 154981

## (undated) DEVICE — FUNNEL KELLER STERILE

## (undated) DEVICE — SUT MONOCRYL 3-0 SH U/D

## (undated) DEVICE — SUT VICRYL 3-0 27 SH

## (undated) DEVICE — SHEET DRAPE FAN-FOLDED 3/4

## (undated) DEVICE — SKINMARKER & RULER REGULAR X-F

## (undated) DEVICE — SYR ONLY LUER LOCK 20CC

## (undated) DEVICE — PACK DRAPE UNIVERSAL CONVERTOR

## (undated) DEVICE — SPONGE LAP 18X18 PREWASHED

## (undated) DEVICE — GLOVE SURGICAL LATEX SZ 7

## (undated) DEVICE — GAUZE SPONGE 4X4 12PLY